# Patient Record
Sex: FEMALE | Race: BLACK OR AFRICAN AMERICAN | Employment: PART TIME | URBAN - METROPOLITAN AREA
[De-identification: names, ages, dates, MRNs, and addresses within clinical notes are randomized per-mention and may not be internally consistent; named-entity substitution may affect disease eponyms.]

---

## 2017-01-10 PROBLEM — R73.03 PREDIABETES: Status: ACTIVE | Noted: 2017-01-10

## 2017-02-13 PROBLEM — Z87.42 HX OF ABNORMAL CERVICAL PAPANICOLAOU SMEAR: Status: ACTIVE | Noted: 2017-02-13

## 2017-02-13 PROCEDURE — 88142 CYTOPATH C/V THIN LAYER: CPT | Performed by: NURSE PRACTITIONER

## 2017-02-15 ENCOUNTER — HOSPITAL ENCOUNTER (OUTPATIENT)
Dept: LAB | Age: 47
Discharge: HOME | End: 2017-02-15

## 2017-04-27 PROBLEM — N30.01 ACUTE CYSTITIS WITH HEMATURIA: Status: ACTIVE | Noted: 2017-04-27

## 2017-09-25 ENCOUNTER — HOSPITAL ENCOUNTER (EMERGENCY)
Age: 47
Discharge: HOME | End: 2017-09-25
Attending: EMERGENCY MEDICINE
Payer: COMMERCIAL

## 2017-09-25 ENCOUNTER — APPOINTMENT (OUTPATIENT)
Dept: CT IMAGING | Age: 47
End: 2017-09-25
Attending: EMERGENCY MEDICINE
Payer: COMMERCIAL

## 2017-09-25 VITALS
RESPIRATION RATE: 16 BRPM | OXYGEN SATURATION: 98 % | TEMPERATURE: 98 F | DIASTOLIC BLOOD PRESSURE: 80 MMHG | SYSTOLIC BLOOD PRESSURE: 135 MMHG | HEART RATE: 75 BPM

## 2017-09-25 DIAGNOSIS — R53.1 GENERALIZED WEAKNESS: ICD-10-CM

## 2017-09-25 DIAGNOSIS — N30.01 ACUTE CYSTITIS WITH HEMATURIA: Primary | ICD-10-CM

## 2017-09-25 LAB
ALBUMIN SERPL-MCNC: 3.6 G/DL (ref 3.5–5)
ALBUMIN/GLOB SERPL: 0.8 {RATIO} (ref 1.2–3.5)
ALP SERPL-CCNC: 72 U/L (ref 50–136)
ALT SERPL-CCNC: 21 U/L (ref 12–65)
ANION GAP SERPL CALC-SCNC: 7 MMOL/L (ref 7–16)
AST SERPL-CCNC: 22 U/L (ref 15–37)
BACTERIA URNS QL MICRO: ABNORMAL /HPF
BASOPHILS # BLD: 0 K/UL (ref 0–0.2)
BASOPHILS NFR BLD: 0 % (ref 0–2)
BILIRUB SERPL-MCNC: 0.2 MG/DL (ref 0.2–1.1)
BUN SERPL-MCNC: 12 MG/DL (ref 6–23)
CALCIUM SERPL-MCNC: 9.3 MG/DL (ref 8.3–10.4)
CASTS URNS QL MICRO: ABNORMAL /LPF
CHLORIDE SERPL-SCNC: 101 MMOL/L (ref 98–107)
CO2 SERPL-SCNC: 32 MMOL/L (ref 21–32)
CREAT SERPL-MCNC: 0.92 MG/DL (ref 0.6–1)
DIFFERENTIAL METHOD BLD: ABNORMAL
EOSINOPHIL # BLD: 0.1 K/UL (ref 0–0.8)
EOSINOPHIL NFR BLD: 2 % (ref 0.5–7.8)
EPI CELLS #/AREA URNS HPF: ABNORMAL /HPF
ERYTHROCYTE [DISTWIDTH] IN BLOOD BY AUTOMATED COUNT: 14.5 % (ref 11.9–14.6)
GLOBULIN SER CALC-MCNC: 4.5 G/DL (ref 2.3–3.5)
GLUCOSE SERPL-MCNC: 100 MG/DL (ref 65–100)
HCG UR QL: NEGATIVE
HCT VFR BLD AUTO: 33 % (ref 35.8–46.3)
HGB BLD-MCNC: 10.6 G/DL (ref 11.7–15.4)
IMM GRANULOCYTES # BLD: 0 K/UL (ref 0–0.5)
IMM GRANULOCYTES NFR BLD: 0.4 % (ref 0–5)
LYMPHOCYTES # BLD: 2 K/UL (ref 0.5–4.6)
LYMPHOCYTES NFR BLD: 39 % (ref 13–44)
MCH RBC QN AUTO: 26.1 PG (ref 26.1–32.9)
MCHC RBC AUTO-ENTMCNC: 32.1 G/DL (ref 31.4–35)
MCV RBC AUTO: 81.3 FL (ref 79.6–97.8)
MONOCYTES # BLD: 0.4 K/UL (ref 0.1–1.3)
MONOCYTES NFR BLD: 8 % (ref 4–12)
NEUTS SEG # BLD: 2.6 K/UL (ref 1.7–8.2)
NEUTS SEG NFR BLD: 51 % (ref 43–78)
PLATELET # BLD AUTO: 345 K/UL (ref 150–450)
PMV BLD AUTO: 10.4 FL (ref 10.8–14.1)
POTASSIUM SERPL-SCNC: 3.4 MMOL/L (ref 3.5–5.1)
PROT SERPL-MCNC: 8.1 G/DL (ref 6.3–8.2)
RBC # BLD AUTO: 4.06 M/UL (ref 4.05–5.25)
RBC #/AREA URNS HPF: >100 /HPF
SODIUM SERPL-SCNC: 140 MMOL/L (ref 136–145)
TROPONIN I BLD-MCNC: 0 NG/ML (ref 0.02–0.05)
WBC # BLD AUTO: 5.1 K/UL (ref 4.3–11.1)
WBC URNS QL MICRO: ABNORMAL /HPF

## 2017-09-25 PROCEDURE — 80053 COMPREHEN METABOLIC PANEL: CPT | Performed by: EMERGENCY MEDICINE

## 2017-09-25 PROCEDURE — 81025 URINE PREGNANCY TEST: CPT

## 2017-09-25 PROCEDURE — 81003 URINALYSIS AUTO W/O SCOPE: CPT | Performed by: EMERGENCY MEDICINE

## 2017-09-25 PROCEDURE — 93005 ELECTROCARDIOGRAM TRACING: CPT | Performed by: EMERGENCY MEDICINE

## 2017-09-25 PROCEDURE — 84484 ASSAY OF TROPONIN QUANT: CPT

## 2017-09-25 PROCEDURE — 70450 CT HEAD/BRAIN W/O DYE: CPT

## 2017-09-25 PROCEDURE — 81015 MICROSCOPIC EXAM OF URINE: CPT | Performed by: EMERGENCY MEDICINE

## 2017-09-25 PROCEDURE — 99284 EMERGENCY DEPT VISIT MOD MDM: CPT | Performed by: EMERGENCY MEDICINE

## 2017-09-25 PROCEDURE — 85025 COMPLETE CBC W/AUTO DIFF WBC: CPT | Performed by: EMERGENCY MEDICINE

## 2017-09-25 RX ORDER — CEPHALEXIN 500 MG/1
500 CAPSULE ORAL 2 TIMES DAILY
Qty: 14 CAP | Refills: 0 | Status: SHIPPED | OUTPATIENT
Start: 2017-09-25 | End: 2017-10-02

## 2017-09-26 LAB
ATRIAL RATE: 66 BPM
CALCULATED P AXIS, ECG09: 64 DEGREES
CALCULATED R AXIS, ECG10: 9 DEGREES
CALCULATED T AXIS, ECG11: 13 DEGREES
DIAGNOSIS, 93000: NORMAL
P-R INTERVAL, ECG05: 164 MS
Q-T INTERVAL, ECG07: 410 MS
QRS DURATION, ECG06: 72 MS
QTC CALCULATION (BEZET), ECG08: 429 MS
VENTRICULAR RATE, ECG03: 66 BPM

## 2017-09-26 NOTE — ED PROVIDER NOTES
HPI Comments: 60-year-old female presents with complaint of diffuse generalized weakness since Friday. Patient reports mild right-sided headache. Denies radiation of pain. Reports history of previous similar headaches. Patient reports intermittent left arm tingling. Patient with numerous other complaints. Denies history of CVA, slurred speech, facial droop, focal weakness, numbness, neck pain, back pain, chest pain, shortness of breath, abdominal pain, fever, chills or nausea, vomiting, LE edema/swelling. Patient is a 56 y.o. female presenting with arm pain. The history is provided by the patient. No  was used. Arm Pain    This is a new problem. The current episode started more than 2 days ago. The problem occurs constantly. The problem has not changed since onset. The pain is present in the left arm. The quality of the pain is described as aching. The pain is at a severity of 1/10. The pain is mild. Associated symptoms include tingling. Pertinent negatives include no numbness, full range of motion, no stiffness, no itching, no back pain and no neck pain. She has tried nothing for the symptoms. The treatment provided no relief. There has been no history of extremity trauma.         Past Medical History:   Diagnosis Date    Abnormal Papanicolaou smear of cervix     Anemia     Dyspepsia 12/22/2014    Encounter for long-term (current) use of other medications 9/24/2015    Fibroid, uterine     GERD (gastroesophageal reflux disease)     Heavy menstrual bleeding 1/26/2015    Hypertension     Kidney stone     Ovarian cyst     with pregnancy    Type II or unspecified type diabetes mellitus without mention of complication, not stated as uncontrolled 9/25/2015    Unspecified vitamin D deficiency 11/20/2014    Uterine fibroid 1/26/2015    UTI (urinary tract infection)     Vaginitis, atrophic        Past Surgical History:   Procedure Laterality Date    FRAGMENT KIDNEY STONE/ ESWL  HX TUBAL LIGATION      tubal    LAP,TUBAL CAUTERY           Family History:   Problem Relation Age of Onset    Diabetes Father     No Known Problems Sister     No Known Problems Brother     Seizures Mother     Hypertension Maternal Aunt     Hypertension Maternal Uncle     Diabetes Paternal Aunt     Diabetes Paternal Uncle        Social History     Social History    Marital status: LEGALLY      Spouse name: N/A    Number of children: N/A    Years of education: N/A     Occupational History    Not on file. Social History Main Topics    Smoking status: Never Smoker    Smokeless tobacco: Never Used    Alcohol use No      Comment: occasionally     Drug use: No    Sexual activity: Not Currently     Birth control/ protection: Surgical     Other Topics Concern    Not on file     Social History Narrative         ALLERGIES: Launie Guardian; Amlodipine; Lisinopril; and Sulfa (sulfonamide antibiotics)    Review of Systems   Constitutional: Negative for chills, fatigue and fever. HENT: Negative for congestion and rhinorrhea. Eyes: Negative for pain and redness. Respiratory: Negative for cough and shortness of breath. Cardiovascular: Negative for chest pain, palpitations and leg swelling. Gastrointestinal: Negative for abdominal distention, abdominal pain, anal bleeding, blood in stool, diarrhea, nausea and vomiting. Endocrine: Negative for polydipsia and polyphagia. Genitourinary: Negative for dysuria, flank pain, genital sores, hematuria, pelvic pain, vaginal bleeding and vaginal discharge. Musculoskeletal: Negative for back pain, gait problem, joint swelling, neck pain, neck stiffness and stiffness. Skin: Negative for itching, pallor and rash. Neurological: Positive for tingling. Negative for dizziness, seizures, syncope, weakness, numbness and headaches. Psychiatric/Behavioral: Negative for agitation and confusion.        Vitals:    09/25/17 2027 09/25/17 2126 09/25/17 2253   BP: 150/82 133/87 135/80   Pulse: 75 75 75   Resp: 16 16 16   Temp: 98 °F (36.7 °C)  98 °F (36.7 °C)   SpO2: 98% 99% 98%            Physical Exam   Constitutional: She is oriented to person, place, and time. She appears well-developed and well-nourished. No distress. HENT:   Head: Normocephalic and atraumatic. Mouth/Throat: Oropharynx is clear and moist. No oropharyngeal exudate. Eyes: Conjunctivae and EOM are normal. Pupils are equal, round, and reactive to light. Neck: Normal range of motion. No JVD present. No tracheal deviation present. Cardiovascular: Normal rate, regular rhythm and intact distal pulses. No murmur heard. Radial pulses 2+ and equal bilaterally    Pulmonary/Chest: Effort normal and breath sounds normal. No respiratory distress. She has no wheezes. She has no rales. She exhibits no tenderness. Abdominal: Soft. Bowel sounds are normal. She exhibits no distension. There is no tenderness. There is no rebound and no guarding. Musculoskeletal: Normal range of motion. She exhibits no edema, tenderness or deformity. Neurological: She is alert and oriented to person, place, and time. No cranial nerve deficit. Coordination normal.   Strength 5/5 throughout. Normal sensory exam.  No slurred speech. No facial droop. Ambulatory without assistance. Skin: Skin is warm and dry. No rash noted. No erythema. No pallor. Psychiatric: She has a normal mood and affect. Her behavior is normal.   Nursing note and vitals reviewed. MDM  Number of Diagnoses or Management Options  Acute cystitis with hematuria: new and requires workup  Generalized weakness: new and requires workup  Diagnosis management comments: Patient with numerous complaints including right-sided headache and left arm tingling sensation since Friday. .  Vital signs stable. Physical exam with no acute findings. Neuro within normal limits. Strength 5 out of 5 throughout. Normal sensory. CT head negative. Labs within normal limits. Patient found to have UTI. Will discharge home with Keflex & instructed to follow up care physician. Amount and/or Complexity of Data Reviewed  Clinical lab tests: ordered and reviewed  Tests in the radiology section of CPT®: ordered and reviewed  Tests in the medicine section of CPT®: ordered and reviewed  Review and summarize past medical records: yes  Independent visualization of images, tracings, or specimens: yes    Risk of Complications, Morbidity, and/or Mortality  Presenting problems: moderate  Diagnostic procedures: moderate  Management options: moderate    Patient Progress  Patient progress: stable    ED Course   Comment By Time   IMPRESSION:     NO ACUTE INTRACRANIAL ABNORMALITY IDENTIFIED AT NONCONTRAST CT.  Ajit Lowry MD 09/25 9292       EKG  Date/Time: 9/25/2017 10:37 PM  Performed by: Dario Hartman  Authorized by: Dario Hartman     ECG reviewed by ED Physician in the absence of a cardiologist: yes    Rate:     ECG rate:  66    ECG rate assessment: normal    Rhythm:     Rhythm: sinus rhythm    Ectopy:     Ectopy: none    QRS:     QRS axis:  Normal    QRS intervals:  Normal  Conduction:     Conduction: normal    ST segments:     ST segments:  Normal  T waves:     T waves: normal

## 2017-09-26 NOTE — ED NOTES
I have reviewed discharge instructions with the patient. The patient verbalized understanding. Patient left ED via Discharge Method: ambulatory to Home with son    Opportunity for questions and clarification provided. Patient given 1 scripts.

## 2017-09-26 NOTE — DISCHARGE INSTRUCTIONS
Weakness: Care Instructions  Your Care Instructions  Weakness is a lack of physical or muscle strength. You may feel that you need to make extra effort to move your arms, legs, or other muscles. Generalized weakness means that you feel weak in most areas of your body. Another type of weakness may affect just one muscle or group of muscles. You may feel weak and tired after you have done too much activity, such as taking an extra-long hike. This is not a serious problem. It often goes away on its own. Feeling weak can also be caused by medical conditions like thyroid problems, depression, or a virus. Sometimes the cause can be serious. Your doctor may want to do more tests to try to find the cause of the weakness. The doctor has checked you carefully, but problems can develop later. If you notice any problems or new symptoms, get medical treatment right away. Follow-up care is a key part of your treatment and safety. Be sure to make and go to all appointments, and call your doctor if you are having problems. It's also a good idea to know your test results and keep a list of the medicines you take. How can you care for yourself at home? · Rest when you feel tired. · Be safe with medicines. If your doctor prescribed medicine, take it exactly as prescribed. Call your doctor if you think you are having a problem with your medicine. You will get more details on the specific medicines your doctor prescribes. · Do not skip meals. Eating a balanced diet may increase your energy level. · Get some physical activity every day, but do not get too tired. When should you call for help? Call your doctor now or seek immediate medical care if:  · You have new or worse weakness. · You are dizzy or lightheaded, or you feel like you may faint. Watch closely for changes in your health, and be sure to contact your doctor if:  · You do not get better as expected. Where can you learn more?   Go to http://marek-zeke.info/. Enter [id number] [id number] [id number] in the search box to learn more about \"Weakness: Care Instructions. \"  Current as of: March 20, 2017  Content Version: 11.3  © 6556-5815 [employer]. Care instructions adapted under license by [employer] (which disclaims liability or warranty for this information). If you have questions about a medical condition or this instruction, always ask your healthcare professional. [address] any warranty or liability for your use of this information. Urinary Tract Infection in Women: Care Instructions  Your Care Instructions    A urinary tract infection, or UTI, is a general term for an infection anywhere between the kidneys and the urethra (where urine comes out). Most UTIs are bladder infections. They often cause pain or burning when you urinate. UTIs are caused by bacteria and can be cured with antibiotics. Be sure to complete your treatment so that the infection goes away. Follow-up care is a key part of your treatment and safety. Be sure to make and go to all appointments, and call your doctor if you are having problems. It's also a good idea to know your test results and keep a list of the medicines you take. How can you care for yourself at home? · Take your antibiotics as directed. Do not stop taking them just because you feel better. You need to take the full course of antibiotics. · Drink extra water and other fluids for the next day or two. This may help wash out the bacteria that are causing the infection. (If you have kidney, heart, or liver disease and have to limit fluids, talk with your doctor before you increase your fluid intake.)  · Avoid drinks that are carbonated or have caffeine. They can irritate the bladder. · Urinate often. Try to empty your bladder each time. · To relieve pain, take a hot bath or lay a heating pad set on low over your lower belly or genital area.  Never go to sleep with a heating pad in place. To prevent UTIs  · Drink plenty of water each day. This helps you urinate often, which clears bacteria from your system. (If you have kidney, heart, or liver disease and have to limit fluids, talk with your doctor before you increase your fluid intake.)  · Urinate when you need to. · Urinate right after you have sex. · Change sanitary pads often. · Avoid douches, bubble baths, feminine hygiene sprays, and other feminine hygiene products that have deodorants. · After going to the bathroom, wipe from front to back. When should you call for help? Call your doctor now or seek immediate medical care if:  · Symptoms such as fever, chills, nausea, or vomiting get worse or appear for the first time. · You have new pain in your back just below your rib cage. This is called flank pain. · There is new blood or pus in your urine. · You have any problems with your antibiotic medicine. Watch closely for changes in your health, and be sure to contact your doctor if:  · You are not getting better after taking an antibiotic for 2 days. · Your symptoms go away but then come back. Where can you learn more? Go to http://marek-zeke.info/. Enter U341 in the search box to learn more about \"Urinary Tract Infection in Women: Care Instructions. \"  Current as of: November 28, 2016  Content Version: 11.3  © 7657-6230 [employer]. Care instructions adapted under license by [employer] (which disclaims liability or warranty for this information). If you have questions about a medical condition or this instruction, always ask your healthcare professional. [address] any warranty or liability for your use of this information.

## 2017-09-26 NOTE — ED TRIAGE NOTES
Pt has multiple complaints. Left arm pain and tingling, right arm pain, behind right eye pressure, and left and right upper jaw pain. Pains started last week.

## 2017-10-12 ENCOUNTER — HOSPITAL ENCOUNTER (OUTPATIENT)
Dept: ULTRASOUND IMAGING | Age: 47
Discharge: HOME | End: 2017-10-12
Attending: FAMILY MEDICINE
Payer: COMMERCIAL

## 2017-10-12 DIAGNOSIS — M79.622 LEFT UPPER ARM PAIN: ICD-10-CM

## 2017-10-12 PROCEDURE — 93931 UPPER EXTREMITY STUDY: CPT

## 2018-05-01 PROBLEM — Z91.018 FOOD ALLERGY: Chronic | Status: ACTIVE | Noted: 2018-05-01

## 2018-05-01 PROBLEM — Z91.018 FOOD ALLERGY: Status: ACTIVE | Noted: 2018-05-01

## 2018-05-02 PROBLEM — J30.1 SEASONAL ALLERGIC RHINITIS DUE TO POLLEN: Status: ACTIVE | Noted: 2018-05-02

## 2018-05-02 PROBLEM — M62.838 MUSCLE SPASM: Status: ACTIVE | Noted: 2018-05-02

## 2018-08-28 PROBLEM — Z87.442 HISTORY OF KIDNEY STONES: Status: ACTIVE | Noted: 2018-08-28

## 2018-08-28 PROBLEM — N30.01 ACUTE CYSTITIS WITH HEMATURIA: Status: RESOLVED | Noted: 2017-04-27 | Resolved: 2018-08-28

## 2018-08-28 PROBLEM — M62.838 MUSCLE SPASM: Status: RESOLVED | Noted: 2018-05-02 | Resolved: 2018-08-28

## 2018-08-28 PROBLEM — Z87.442 HISTORY OF KIDNEY STONES: Chronic | Status: ACTIVE | Noted: 2018-08-28

## 2018-09-13 ENCOUNTER — HOSPITAL ENCOUNTER (OUTPATIENT)
Dept: CT IMAGING | Age: 48
Discharge: HOME | End: 2018-09-13
Attending: FAMILY MEDICINE
Payer: COMMERCIAL

## 2018-09-13 VITALS — WEIGHT: 165 LBS | BODY MASS INDEX: 31.15 KG/M2 | HEIGHT: 61 IN

## 2018-09-13 DIAGNOSIS — R10.2 PELVIC PAIN: ICD-10-CM

## 2018-09-13 DIAGNOSIS — R10.32 LLQ ABDOMINAL PAIN: ICD-10-CM

## 2018-09-13 DIAGNOSIS — R10.31 RLQ ABDOMINAL PAIN: ICD-10-CM

## 2018-09-13 PROCEDURE — 74011000258 HC RX REV CODE- 258: Performed by: FAMILY MEDICINE

## 2018-09-13 PROCEDURE — 74011636320 HC RX REV CODE- 636/320: Performed by: FAMILY MEDICINE

## 2018-09-13 PROCEDURE — 74177 CT ABD & PELVIS W/CONTRAST: CPT

## 2018-09-13 RX ORDER — SODIUM CHLORIDE 0.9 % (FLUSH) 0.9 %
10 SYRINGE (ML) INJECTION
Status: COMPLETED | OUTPATIENT
Start: 2018-09-13 | End: 2018-09-13

## 2018-09-13 RX ADMIN — SODIUM CHLORIDE 100 ML: 900 INJECTION, SOLUTION INTRAVENOUS at 09:18

## 2018-09-13 RX ADMIN — Medication 10 ML: at 09:18

## 2018-09-13 RX ADMIN — IOPAMIDOL 100 ML: 755 INJECTION, SOLUTION INTRAVENOUS at 09:18

## 2018-09-13 RX ADMIN — DIATRIZOATE MEGLUMINE AND DIATRIZOATE SODIUM 15 ML: 660; 100 LIQUID ORAL; RECTAL at 09:18

## 2018-09-13 NOTE — PROGRESS NOTES
CT abdomen and pelvis: kidney stones present on the left side.    She has a history of kidney stones and has a nephrologist.

## 2018-09-17 ENCOUNTER — HOSPITAL ENCOUNTER (OUTPATIENT)
Dept: LAB | Age: 48
Discharge: HOME | End: 2018-09-17
Payer: COMMERCIAL

## 2018-09-17 DIAGNOSIS — R00.2 PALPITATIONS: ICD-10-CM

## 2018-09-17 DIAGNOSIS — R00.1 BRADYCARDIA: ICD-10-CM

## 2018-09-17 PROBLEM — R10.9 FLANK PAIN: Status: ACTIVE | Noted: 2018-09-17

## 2018-09-17 LAB
T4 FREE SERPL-MCNC: 0.9 NG/DL (ref 0.78–1.46)
TSH SERPL DL<=0.005 MIU/L-ACNC: 1.34 UIU/ML (ref 0.36–3.74)

## 2018-09-17 PROCEDURE — 84443 ASSAY THYROID STIM HORMONE: CPT

## 2018-09-17 PROCEDURE — 84439 ASSAY OF FREE THYROXINE: CPT

## 2018-09-17 PROCEDURE — 36415 COLL VENOUS BLD VENIPUNCTURE: CPT

## 2018-09-17 NOTE — PROGRESS NOTES
Patient is aware of the results. She states that she does not currently have a Nephrologist. She is requesting to be referred to EUNICE Keating who she was previously seeing.

## 2018-09-18 NOTE — PROGRESS NOTES
Will place a referral to a nephrologist. Will let Cherie Rodríguez know she wants to see the nurse practitioner.

## 2019-03-03 ENCOUNTER — ANESTHESIA EVENT (OUTPATIENT)
Dept: SURGERY | Age: 49
End: 2019-03-03
Payer: COMMERCIAL

## 2019-03-04 ENCOUNTER — HOSPITAL ENCOUNTER (OUTPATIENT)
Age: 49
Setting detail: OUTPATIENT SURGERY
Discharge: HOME | End: 2019-03-04
Attending: UROLOGY | Admitting: UROLOGY
Payer: COMMERCIAL

## 2019-03-04 ENCOUNTER — ANESTHESIA (OUTPATIENT)
Dept: SURGERY | Age: 49
End: 2019-03-04
Payer: COMMERCIAL

## 2019-03-04 VITALS
SYSTOLIC BLOOD PRESSURE: 178 MMHG | HEART RATE: 70 BPM | OXYGEN SATURATION: 96 % | RESPIRATION RATE: 22 BRPM | DIASTOLIC BLOOD PRESSURE: 82 MMHG | TEMPERATURE: 97.5 F | WEIGHT: 162 LBS | BODY MASS INDEX: 30.61 KG/M2

## 2019-03-04 DIAGNOSIS — Z87.442 HISTORY OF KIDNEY STONES: Primary | Chronic | ICD-10-CM

## 2019-03-04 LAB
ANION GAP SERPL CALC-SCNC: 7 MMOL/L (ref 7–16)
BUN SERPL-MCNC: 8 MG/DL (ref 6–23)
CALCIUM SERPL-MCNC: 8.7 MG/DL (ref 8.3–10.4)
CHLORIDE SERPL-SCNC: 103 MMOL/L (ref 98–107)
CO2 SERPL-SCNC: 28 MMOL/L (ref 21–32)
CREAT SERPL-MCNC: 0.75 MG/DL (ref 0.6–1)
ERYTHROCYTE [DISTWIDTH] IN BLOOD BY AUTOMATED COUNT: 12.5 % (ref 11.9–14.6)
GLUCOSE SERPL-MCNC: 76 MG/DL (ref 65–100)
HCT VFR BLD AUTO: 35.3 % (ref 35.8–46.3)
HGB BLD-MCNC: 11.5 G/DL (ref 11.7–15.4)
MCH RBC QN AUTO: 28.6 PG (ref 26.1–32.9)
MCHC RBC AUTO-ENTMCNC: 32.6 G/DL (ref 31.4–35)
MCV RBC AUTO: 87.8 FL (ref 79.6–97.8)
NRBC # BLD: 0 K/UL (ref 0–0.2)
PLATELET # BLD AUTO: 280 K/UL (ref 150–450)
PMV BLD AUTO: 10.7 FL (ref 9.4–12.3)
POTASSIUM SERPL-SCNC: 4 MMOL/L (ref 3.5–5.1)
RBC # BLD AUTO: 4.02 M/UL (ref 4.05–5.2)
SODIUM SERPL-SCNC: 138 MMOL/L (ref 136–145)
WBC # BLD AUTO: 5 K/UL (ref 4.3–11.1)

## 2019-03-04 PROCEDURE — 74011250636 HC RX REV CODE- 250/636: Performed by: ANESTHESIOLOGY

## 2019-03-04 PROCEDURE — 77030010509 HC AIRWY LMA MSK TELE -A: Performed by: ANESTHESIOLOGY

## 2019-03-04 PROCEDURE — 76060000033 HC ANESTHESIA 1 TO 1.5 HR: Performed by: UROLOGY

## 2019-03-04 PROCEDURE — 74011250636 HC RX REV CODE- 250/636

## 2019-03-04 PROCEDURE — 80048 BASIC METABOLIC PNL TOTAL CA: CPT

## 2019-03-04 PROCEDURE — 77030032490 HC SLV COMPR SCD KNE COVD -B: Performed by: UROLOGY

## 2019-03-04 PROCEDURE — 74011250637 HC RX REV CODE- 250/637: Performed by: ANESTHESIOLOGY

## 2019-03-04 PROCEDURE — 74011000250 HC RX REV CODE- 250

## 2019-03-04 PROCEDURE — 50590 FRAGMENTING OF KIDNEY STONE: CPT | Performed by: UROLOGY

## 2019-03-04 PROCEDURE — 76210000006 HC OR PH I REC 0.5 TO 1 HR: Performed by: UROLOGY

## 2019-03-04 PROCEDURE — 76010000138 HC OR TIME 0.5 TO 1 HR: Performed by: UROLOGY

## 2019-03-04 PROCEDURE — 77030020782 HC GWN BAIR PAWS FLX 3M -B: Performed by: ANESTHESIOLOGY

## 2019-03-04 PROCEDURE — 85027 COMPLETE CBC AUTOMATED: CPT

## 2019-03-04 PROCEDURE — 76210000026 HC REC RM PH II 1 TO 1.5 HR: Performed by: UROLOGY

## 2019-03-04 RX ORDER — DEXAMETHASONE SODIUM PHOSPHATE 4 MG/ML
INJECTION, SOLUTION INTRA-ARTICULAR; INTRALESIONAL; INTRAMUSCULAR; INTRAVENOUS; SOFT TISSUE AS NEEDED
Status: DISCONTINUED | OUTPATIENT
Start: 2019-03-04 | End: 2019-03-04 | Stop reason: HOSPADM

## 2019-03-04 RX ORDER — TAMSULOSIN HYDROCHLORIDE 0.4 MG/1
0.4 CAPSULE ORAL DAILY
Qty: 20 CAP | Refills: 0 | Status: SHIPPED | OUTPATIENT
Start: 2019-03-04 | End: 2019-08-26

## 2019-03-04 RX ORDER — CEFAZOLIN SODIUM/WATER 2 G/20 ML
2 SYRINGE (ML) INTRAVENOUS
Status: DISCONTINUED | OUTPATIENT
Start: 2019-03-04 | End: 2019-03-04 | Stop reason: HOSPADM

## 2019-03-04 RX ORDER — NALOXONE HYDROCHLORIDE 0.4 MG/ML
0.04 INJECTION, SOLUTION INTRAMUSCULAR; INTRAVENOUS; SUBCUTANEOUS
Status: DISCONTINUED | OUTPATIENT
Start: 2019-03-04 | End: 2019-03-04 | Stop reason: HOSPADM

## 2019-03-04 RX ORDER — ONDANSETRON 2 MG/ML
INJECTION INTRAMUSCULAR; INTRAVENOUS AS NEEDED
Status: DISCONTINUED | OUTPATIENT
Start: 2019-03-04 | End: 2019-03-04 | Stop reason: HOSPADM

## 2019-03-04 RX ORDER — HYDROMORPHONE HYDROCHLORIDE 2 MG/ML
0.5 INJECTION, SOLUTION INTRAMUSCULAR; INTRAVENOUS; SUBCUTANEOUS
Status: DISCONTINUED | OUTPATIENT
Start: 2019-03-04 | End: 2019-03-04 | Stop reason: HOSPADM

## 2019-03-04 RX ORDER — LIDOCAINE HYDROCHLORIDE 10 MG/ML
0.1 INJECTION INFILTRATION; PERINEURAL AS NEEDED
Status: DISCONTINUED | OUTPATIENT
Start: 2019-03-04 | End: 2019-03-04 | Stop reason: HOSPADM

## 2019-03-04 RX ORDER — OXYCODONE HYDROCHLORIDE 5 MG/1
5 TABLET ORAL
Status: COMPLETED | OUTPATIENT
Start: 2019-03-04 | End: 2019-03-04

## 2019-03-04 RX ORDER — FENTANYL CITRATE 50 UG/ML
100 INJECTION, SOLUTION INTRAMUSCULAR; INTRAVENOUS ONCE
Status: DISCONTINUED | OUTPATIENT
Start: 2019-03-04 | End: 2019-03-04 | Stop reason: HOSPADM

## 2019-03-04 RX ORDER — SODIUM CHLORIDE, SODIUM LACTATE, POTASSIUM CHLORIDE, CALCIUM CHLORIDE 600; 310; 30; 20 MG/100ML; MG/100ML; MG/100ML; MG/100ML
100 INJECTION, SOLUTION INTRAVENOUS CONTINUOUS
Status: DISCONTINUED | OUTPATIENT
Start: 2019-03-04 | End: 2019-03-04 | Stop reason: HOSPADM

## 2019-03-04 RX ORDER — MIDAZOLAM HYDROCHLORIDE 1 MG/ML
2 INJECTION, SOLUTION INTRAMUSCULAR; INTRAVENOUS
Status: COMPLETED | OUTPATIENT
Start: 2019-03-04 | End: 2019-03-04

## 2019-03-04 RX ORDER — OXYCODONE HYDROCHLORIDE 5 MG/1
5 TABLET ORAL ONCE
Status: COMPLETED | OUTPATIENT
Start: 2019-03-04 | End: 2019-03-04

## 2019-03-04 RX ORDER — SODIUM CHLORIDE, SODIUM LACTATE, POTASSIUM CHLORIDE, CALCIUM CHLORIDE 600; 310; 30; 20 MG/100ML; MG/100ML; MG/100ML; MG/100ML
INJECTION, SOLUTION INTRAVENOUS
Status: DISCONTINUED | OUTPATIENT
Start: 2019-03-04 | End: 2019-03-04 | Stop reason: HOSPADM

## 2019-03-04 RX ORDER — LIDOCAINE HYDROCHLORIDE 20 MG/ML
INJECTION, SOLUTION EPIDURAL; INFILTRATION; INTRACAUDAL; PERINEURAL AS NEEDED
Status: DISCONTINUED | OUTPATIENT
Start: 2019-03-04 | End: 2019-03-04 | Stop reason: HOSPADM

## 2019-03-04 RX ORDER — PROPOFOL 10 MG/ML
INJECTION, EMULSION INTRAVENOUS AS NEEDED
Status: DISCONTINUED | OUTPATIENT
Start: 2019-03-04 | End: 2019-03-04 | Stop reason: HOSPADM

## 2019-03-04 RX ORDER — HYDROCODONE BITARTRATE AND ACETAMINOPHEN 5; 325 MG/1; MG/1
1-2 TABLET ORAL
Qty: 20 TAB | Refills: 0 | Status: SHIPPED | OUTPATIENT
Start: 2019-03-04 | End: 2019-03-09

## 2019-03-04 RX ORDER — FENTANYL CITRATE 50 UG/ML
INJECTION, SOLUTION INTRAMUSCULAR; INTRAVENOUS AS NEEDED
Status: DISCONTINUED | OUTPATIENT
Start: 2019-03-04 | End: 2019-03-04 | Stop reason: HOSPADM

## 2019-03-04 RX ORDER — MIDAZOLAM HYDROCHLORIDE 1 MG/ML
2 INJECTION, SOLUTION INTRAMUSCULAR; INTRAVENOUS ONCE
Status: DISCONTINUED | OUTPATIENT
Start: 2019-03-04 | End: 2019-03-04 | Stop reason: HOSPADM

## 2019-03-04 RX ADMIN — ONDANSETRON 4 MG: 2 INJECTION INTRAMUSCULAR; INTRAVENOUS at 16:06

## 2019-03-04 RX ADMIN — OXYCODONE HYDROCHLORIDE 5 MG: 5 TABLET ORAL at 16:57

## 2019-03-04 RX ADMIN — FENTANYL CITRATE 50 MCG: 50 INJECTION, SOLUTION INTRAMUSCULAR; INTRAVENOUS at 15:22

## 2019-03-04 RX ADMIN — MIDAZOLAM HYDROCHLORIDE 2 MG: 2 INJECTION, SOLUTION INTRAMUSCULAR; INTRAVENOUS at 15:13

## 2019-03-04 RX ADMIN — SODIUM CHLORIDE, SODIUM LACTATE, POTASSIUM CHLORIDE, CALCIUM CHLORIDE: 600; 310; 30; 20 INJECTION, SOLUTION INTRAVENOUS at 15:15

## 2019-03-04 RX ADMIN — DEXAMETHASONE SODIUM PHOSPHATE 4 MG: 4 INJECTION, SOLUTION INTRA-ARTICULAR; INTRALESIONAL; INTRAMUSCULAR; INTRAVENOUS; SOFT TISSUE at 15:30

## 2019-03-04 RX ADMIN — SODIUM CHLORIDE, SODIUM LACTATE, POTASSIUM CHLORIDE, AND CALCIUM CHLORIDE 100 ML/HR: 600; 310; 30; 20 INJECTION, SOLUTION INTRAVENOUS at 14:09

## 2019-03-04 RX ADMIN — FENTANYL CITRATE 50 MCG: 50 INJECTION, SOLUTION INTRAMUSCULAR; INTRAVENOUS at 15:34

## 2019-03-04 RX ADMIN — OXYCODONE HYDROCHLORIDE 5 MG: 5 TABLET ORAL at 17:40

## 2019-03-04 RX ADMIN — LIDOCAINE HYDROCHLORIDE 50 MG: 20 INJECTION, SOLUTION EPIDURAL; INFILTRATION; INTRACAUDAL; PERINEURAL at 15:22

## 2019-03-04 RX ADMIN — PROPOFOL 200 MG: 10 INJECTION, EMULSION INTRAVENOUS at 15:22

## 2019-03-04 NOTE — BRIEF OP NOTE
BRIEF OPERATIVE NOTE Date of Procedure: 3/4/2019 Preoperative Diagnosis: Stone, kidney [N20.0] Postoperative Diagnosis: Stone, kidney [N20.0] Procedure(s): LEFT LITHOTRIPSY EXTRACORPOREAL SHOCKWAVE ESWL Surgeon(s) and Role: * Pippa Vera MD - Primary Surgical Assistant: none Surgical Staff: 
Circ-1: Giovani Jeffers RN Radiology Technician: Laura Bhat RT, R, CT Event Time In Time Out Incision Start [phone] Incision Close 1610 Anesthesia: General  
Estimated Blood Loss: none Specimens: * No specimens in log * Findings: see op note Complications: none Implants: * No implants in log *

## 2019-03-04 NOTE — ANESTHESIA PREPROCEDURE EVALUATION
Anesthetic History No history of anesthetic complications Review of Systems / Medical History Patient summary reviewed and pertinent labs reviewed Pulmonary Within defined limits Neuro/Psych Within defined limits Cardiovascular Hypertension: well controlled Exercise tolerance: >4 METS 
  
GI/Hepatic/Renal 
Within defined limits Endo/Other Obesity Other Findings Physical Exam 
 
Airway Mallampati: II 
TM Distance: 4 - 6 cm Neck ROM: normal range of motion Mouth opening: Normal 
 
 Cardiovascular Rhythm: regular Rate: normal 
 
 
 
 Dental 
 
 
  
Pulmonary Breath sounds clear to auscultation Abdominal 
 
 
 
 Other Findings Anesthetic Plan ASA: 2 Anesthesia type: general 
 
 
 
 
Induction: Intravenous Anesthetic plan and risks discussed with: Patient and Son / Daughter

## 2019-03-04 NOTE — PERIOP NOTES
Preoperative flank skin condition: warm, dry, intact Lead shield: yes Patient ear protection: yes Gel applied to patient's flank and Lithotripsy head: yes Starting power level: 3 Shock start time (first  fluoroscopy): 5637 Shock stop time (last lithotripsy shock): 1610 Ending power level: 11 Total shocks: 3000 Total fluoroscopy time: 2 min 11 sec Postoperative flank skin condition: pink

## 2019-03-04 NOTE — ANESTHESIA POSTPROCEDURE EVALUATION
Procedure(s): LEFT LITHOTRIPSY EXTRACORPOREAL SHOCKWAVE ESWL. Anesthesia Post Evaluation Patient location during evaluation: PACU Patient participation: complete - patient participated Level of consciousness: awake and alert Pain management: adequate Airway patency: patent Anesthetic complications: no 
Cardiovascular status: acceptable Respiratory status: acceptable Hydration status: acceptable Post anesthesia nausea and vomiting:  none Visit Vitals /84 Pulse 60 Temp 36.4 °C (97.5 °F) Resp 17 Wt 73.5 kg (162 lb) SpO2 100% BMI 30.61 kg/m²

## 2019-03-05 NOTE — OP NOTES
[facility]  OPERATIVE REPORT    Name:  Rodney Ruvalcaba  MR#:  [id number]  :  1970  ACCOUNT #:  [de-identified]  DATE OF SERVICE:  2019      PREOPERATIVE DIAGNOSIS:  Left renal calculus. POSTOPERATIVE DIAGNOSIS:  Left renal calculus. PROCEDURE:  Left extracorporeal shock wave lithotripsy. SURGEON:  Moni Powell MD    ASSISTANT:  None. ANESTHESIA:  General.    COMPLICATIONS:  None. SPECIMENS:  None. IMPLANTS:  None. ESTIMATED BLOOD LOSS:  None. FINDINGS:  An 8 mm stone in the area of the left renal pelvis. DESCRIPTION OF THE PROCEDURE:  The patient was given general anesthetic, placed in the supine position on the lithotripsy treatment table. Fluoroscopy was used to identify the stone. There was an 8 mm stone in the area of the left renal pelvis. Treatment was began at a low treatment setting, gradually increased up to a power setting of 11 kV. A total of 3000 shocks were given. The stone showed excellent pulverization. She tolerated this well. PLAN:  She will be discharged home. She is to return to the office in two weeks for KUB.       MD ZELDA Burt/[email or web address]/  D:  2019 16:31  T:  2019 3:26  JOB #:  [id number]

## 2019-08-26 ENCOUNTER — HOSPITAL ENCOUNTER (EMERGENCY)
Age: 49
Discharge: HOME | End: 2019-08-26
Attending: EMERGENCY MEDICINE
Payer: SELF-PAY

## 2019-08-26 VITALS
SYSTOLIC BLOOD PRESSURE: 151 MMHG | HEIGHT: 61 IN | RESPIRATION RATE: 18 BRPM | TEMPERATURE: 98.2 F | BODY MASS INDEX: 30.58 KG/M2 | HEART RATE: 61 BPM | OXYGEN SATURATION: 94 % | DIASTOLIC BLOOD PRESSURE: 86 MMHG | WEIGHT: 162 LBS

## 2019-08-26 DIAGNOSIS — R10.32 ABDOMINAL PAIN, LLQ (LEFT LOWER QUADRANT): ICD-10-CM

## 2019-08-26 DIAGNOSIS — N30.01 ACUTE CYSTITIS WITH HEMATURIA: Primary | ICD-10-CM

## 2019-08-26 LAB
ALBUMIN SERPL-MCNC: 3.6 G/DL (ref 3.5–5)
ALBUMIN/GLOB SERPL: 0.9 {RATIO} (ref 1.2–3.5)
ALP SERPL-CCNC: 64 U/L (ref 50–130)
ALT SERPL-CCNC: 19 U/L (ref 12–65)
ANION GAP SERPL CALC-SCNC: 7 MMOL/L (ref 7–16)
AST SERPL-CCNC: 18 U/L (ref 15–37)
BACTERIA URNS QL MICRO: ABNORMAL /HPF
BASOPHILS # BLD: 0 K/UL (ref 0–0.2)
BASOPHILS NFR BLD: 0 % (ref 0–2)
BILIRUB SERPL-MCNC: 0.2 MG/DL (ref 0.2–1.1)
BUN SERPL-MCNC: 10 MG/DL (ref 6–23)
CALCIUM SERPL-MCNC: 9.3 MG/DL (ref 8.3–10.4)
CASTS URNS QL MICRO: ABNORMAL /LPF
CHLORIDE SERPL-SCNC: 104 MMOL/L (ref 98–107)
CO2 SERPL-SCNC: 29 MMOL/L (ref 21–32)
CREAT SERPL-MCNC: 0.84 MG/DL (ref 0.6–1)
DIFFERENTIAL METHOD BLD: NORMAL
EOSINOPHIL # BLD: 0.1 K/UL (ref 0–0.8)
EOSINOPHIL NFR BLD: 1 % (ref 0.5–7.8)
EPI CELLS #/AREA URNS HPF: ABNORMAL /HPF
ERYTHROCYTE [DISTWIDTH] IN BLOOD BY AUTOMATED COUNT: 13.2 % (ref 11.9–14.6)
GLOBULIN SER CALC-MCNC: 4.2 G/DL (ref 2.3–3.5)
GLUCOSE SERPL-MCNC: 117 MG/DL (ref 65–100)
HCG UR QL: NEGATIVE
HCT VFR BLD AUTO: 37.7 % (ref 35.8–46.3)
HGB BLD-MCNC: 12.3 G/DL (ref 11.7–15.4)
IMM GRANULOCYTES # BLD AUTO: 0 K/UL (ref 0–0.5)
IMM GRANULOCYTES NFR BLD AUTO: 0 % (ref 0–5)
LYMPHOCYTES # BLD: 1.8 K/UL (ref 0.5–4.6)
LYMPHOCYTES NFR BLD: 31 % (ref 13–44)
MCH RBC QN AUTO: 28.9 PG (ref 26.1–32.9)
MCHC RBC AUTO-ENTMCNC: 32.6 G/DL (ref 31.4–35)
MCV RBC AUTO: 88.5 FL (ref 79.6–97.8)
MONOCYTES # BLD: 0.5 K/UL (ref 0.1–1.3)
MONOCYTES NFR BLD: 8 % (ref 4–12)
NEUTS SEG # BLD: 3.4 K/UL (ref 1.7–8.2)
NEUTS SEG NFR BLD: 59 % (ref 43–78)
NRBC # BLD: 0 K/UL (ref 0–0.2)
PLATELET # BLD AUTO: 254 K/UL (ref 150–450)
PMV BLD AUTO: 10 FL (ref 9.4–12.3)
POTASSIUM SERPL-SCNC: 4.1 MMOL/L (ref 3.5–5.1)
PROT SERPL-MCNC: 7.8 G/DL (ref 6.3–8.2)
RBC # BLD AUTO: 4.26 M/UL (ref 4.05–5.2)
RBC #/AREA URNS HPF: ABNORMAL /HPF
SODIUM SERPL-SCNC: 140 MMOL/L (ref 136–145)
WBC # BLD AUTO: 5.8 K/UL (ref 4.3–11.1)
WBC URNS QL MICRO: ABNORMAL /HPF

## 2019-08-26 PROCEDURE — 81025 URINE PREGNANCY TEST: CPT

## 2019-08-26 PROCEDURE — 81015 MICROSCOPIC EXAM OF URINE: CPT

## 2019-08-26 PROCEDURE — 81003 URINALYSIS AUTO W/O SCOPE: CPT | Performed by: EMERGENCY MEDICINE

## 2019-08-26 PROCEDURE — 99284 EMERGENCY DEPT VISIT MOD MDM: CPT | Performed by: EMERGENCY MEDICINE

## 2019-08-26 PROCEDURE — 80053 COMPREHEN METABOLIC PANEL: CPT

## 2019-08-26 PROCEDURE — 85025 COMPLETE CBC W/AUTO DIFF WBC: CPT

## 2019-08-26 RX ORDER — CEPHALEXIN 500 MG/1
500 CAPSULE ORAL 3 TIMES DAILY
Qty: 21 CAP | Refills: 0 | Status: SHIPPED | OUTPATIENT
Start: 2019-08-26 | End: 2019-09-02

## 2019-08-26 NOTE — DISCHARGE INSTRUCTIONS
Follow-up with Dr. Kane Neves, call her office to make an appointment. Return to the emergency department if your symptoms worsen despite the antibiotics.

## 2019-08-26 NOTE — ED PROVIDER NOTES
Patient states she has been having abdominal pain for the past 2 months. She also has not had a period over that time. She describes the pain as left-sided achy sharp pain without any aggravating or alleviating factors. She has had urinary frequency but denies any dysuria. She denies any fever or vomiting, states she has had frequent loose stools at times. She has not taken any medicine for symptoms, denies similar symptoms in the past.    Elements of this note were made using speech recognition software. As such, errors of speech recognition may occur.            Past Medical History:   Diagnosis Date    Abnormal Papanicolaou smear of cervix     Anemia     Dyspepsia 12/22/2014    Encounter for long-term (current) use of other medications 9/24/2015    Fibroid, uterine     GERD (gastroesophageal reflux disease)     no meds    Heavy menstrual bleeding 1/26/2015    History of kidney stones 8/28/2018    Hypertension     no meds    Kidney stone     Ovarian cyst     with pregnancy    Type II or unspecified type diabetes mellitus without mention of complication, not stated as uncontrolled 09/25/2015    borderline- no meds    Unspecified vitamin D deficiency 11/20/2014    Uterine fibroid 1/26/2015    UTI (urinary tract infection)     Vaginitis, atrophic        Past Surgical History:   Procedure Laterality Date    FRAGMENT KIDNEY STONE/ ESWL      x 2    HX TUBAL LIGATION      tubal    LAP,TUBAL CAUTERY           Family History:   Problem Relation Age of Onset    Diabetes Father     No Known Problems Sister     No Known Problems Brother     Seizures Mother     Hypertension Maternal Aunt     Hypertension Maternal Uncle     Diabetes Paternal Aunt     Diabetes Paternal Uncle     Coronary Artery Disease Neg Hx        Social History     Socioeconomic History    Marital status:      Spouse name: Not on file    Number of children: Not on file    Years of education: Not on file   [place] Highest education level: Not on file   Occupational History    Not on file   Social Needs    Financial resource strain: Not on file    Food insecurity:     Worry: Not on file     Inability: Not on file    Transportation needs:     Medical: Not on file     Non-medical: Not on file   Tobacco Use    Smoking status: Never Smoker    Smokeless tobacco: Never Used   Substance and Sexual Activity    Alcohol use: No    Drug use: No    Sexual activity: Not Currently     Birth control/protection: Surgical   Lifestyle    Physical activity:     Days per week: Not on file     Minutes per session: Not on file    Stress: Not on file   Relationships    Social connections:     Talks on phone: Not on file     Gets together: Not on file     Attends [employer] service: Not on file     Active member of club or organization: Not on file     Attends meetings of clubs or organizations: Not on file     Relationship status: Not on file    Intimate partner violence:     Fear of current or ex partner: Not on file     Emotionally abused: Not on file     Physically abused: Not on file     Forced sexual activity: Not on file   Other Topics Concern    Not on file   Social History Narrative    Not on file         ALLERGIES: Benicar Lars Parlor; Amlodipine; Lisinopril; and Sulfa (sulfonamide antibiotics)    Review of Systems   Constitutional: Negative for chills and fever. Gastrointestinal: Positive for abdominal pain. Negative for nausea and vomiting. All other systems reviewed and are negative. Vitals:    08/26/19 1612   BP: (!) 162/95   Pulse: 78   Resp: 18   Temp: 98.4 °F (36.9 °C)   SpO2: 99%   Weight: 73.5 kg (162 lb)   Height: 5' 1\" (1.549 m)            Physical Exam   Constitutional: She is oriented to person, place, and time. She appears well-developed and well-nourished. HENT:   Head: Normocephalic and atraumatic. Eyes: Pupils are equal, round, and reactive to light.  Conjunctivae are normal.   Neck: Normal range of motion. Neck supple. Cardiovascular: Normal rate, regular rhythm and normal heart sounds. Pulmonary/Chest: Effort normal and breath sounds normal.   Abdominal: Soft. Bowel sounds are normal. She exhibits no distension. There is no tenderness. There is no guarding. Musculoskeletal: She exhibits no edema or tenderness. Neurological: She is alert and oriented to person, place, and time. Skin: Skin is warm and dry. Psychiatric: She has a normal mood and affect. Her behavior is normal.   Nursing note and vitals reviewed. MDM  Number of Diagnoses or Management Options  Abdominal pain, LLQ (left lower quadrant): new and requires workup  Acute cystitis with hematuria: new and does not require workup  Diagnosis management comments: 8:16 PM discussed results with patient, need for antibiotics. She appears comfortable and in no distress. She has a primary doctor she can follow-up with.        Amount and/or Complexity of Data Reviewed  Clinical lab tests: ordered and reviewed    Risk of Complications, Morbidity, and/or Mortality  Presenting problems: moderate  Diagnostic procedures: moderate  Management options: moderate    Patient Progress  Patient progress: improved         Procedures

## 2019-08-26 NOTE — ED TRIAGE NOTES
Pt in states nausea and left side abdominal pain x 1 month. States she has not had menstrual period for 2 months.  States has had tubal. States vomiting or diarrhea

## 2019-08-27 NOTE — ED NOTES
I have reviewed discharge instructions with the patient. The patient verbalized understanding. Patient left ED via Discharge Method: ambulatory to Home with self. Opportunity for questions and clarification provided. Patient given 1 scripts. To continue your aftercare when you leave the hospital, you may receive an automated call from our care team to check in on how you are doing. This is a free service and part of our promise to provide the best care and service to meet your aftercare needs.  If you have questions, or wish to unsubscribe from this service please call [phone]. Thank you for Choosing our [address] [place] [address] Emergency Department.

## 2020-08-01 PROBLEM — R10.2 PELVIC PAIN: Status: ACTIVE | Noted: 2020-08-01

## 2020-08-01 PROBLEM — N91.2 AMENORRHEA: Status: ACTIVE | Noted: 2020-08-01

## 2020-08-03 ENCOUNTER — HOSPITAL ENCOUNTER (OUTPATIENT)
Dept: ULTRASOUND IMAGING | Age: 50
Discharge: HOME | End: 2020-08-03
Attending: FAMILY MEDICINE
Payer: COMMERCIAL

## 2020-08-03 DIAGNOSIS — N91.2 AMENORRHEA: ICD-10-CM

## 2020-08-03 DIAGNOSIS — R10.2 PELVIC PAIN: ICD-10-CM

## 2020-08-03 PROCEDURE — 76830 TRANSVAGINAL US NON-OB: CPT

## 2020-09-05 NOTE — PROGRESS NOTES
Pelvic ultrasound: there is a small 1.8 cm uterine fibroid. Also a 1.4 cm left ovarian follicle. This may be causing the pelvic pain.  Will refer to a gynecologist.

## 2020-11-11 ENCOUNTER — HOSPITAL ENCOUNTER (EMERGENCY)
Age: 50
Discharge: HOME | End: 2020-11-11
Attending: EMERGENCY MEDICINE
Payer: COMMERCIAL

## 2020-11-11 VITALS
WEIGHT: 160 LBS | TEMPERATURE: 98.5 F | OXYGEN SATURATION: 96 % | HEIGHT: 61 IN | SYSTOLIC BLOOD PRESSURE: 160 MMHG | BODY MASS INDEX: 30.21 KG/M2 | RESPIRATION RATE: 18 BRPM | DIASTOLIC BLOOD PRESSURE: 91 MMHG | HEART RATE: 84 BPM

## 2020-11-11 DIAGNOSIS — N30.01 ACUTE CYSTITIS WITH HEMATURIA: Primary | ICD-10-CM

## 2020-11-11 LAB
BACTERIA URNS QL MICRO: 0 /HPF
CASTS URNS QL MICRO: 0 /LPF
EPI CELLS #/AREA URNS HPF: ABNORMAL /HPF
HCG UR QL: NEGATIVE
RBC #/AREA URNS HPF: ABNORMAL /HPF
WBC URNS QL MICRO: >100 /HPF

## 2020-11-11 PROCEDURE — 74011250637 HC RX REV CODE- 250/637: Performed by: EMERGENCY MEDICINE

## 2020-11-11 PROCEDURE — 81015 MICROSCOPIC EXAM OF URINE: CPT

## 2020-11-11 PROCEDURE — 81003 URINALYSIS AUTO W/O SCOPE: CPT

## 2020-11-11 PROCEDURE — 81025 URINE PREGNANCY TEST: CPT

## 2020-11-11 PROCEDURE — 99284 EMERGENCY DEPT VISIT MOD MDM: CPT

## 2020-11-11 PROCEDURE — 87086 URINE CULTURE/COLONY COUNT: CPT

## 2020-11-11 RX ORDER — CEPHALEXIN 500 MG/1
500 CAPSULE ORAL
Status: COMPLETED | OUTPATIENT
Start: 2020-11-11 | End: 2020-11-11

## 2020-11-11 RX ORDER — IBUPROFEN 400 MG/1
TABLET ORAL
Status: ACTIVE
Start: 2020-11-11 | End: 2020-11-11

## 2020-11-11 RX ORDER — CEPHALEXIN 500 MG/1
500 CAPSULE ORAL 2 TIMES DAILY
Qty: 10 CAP | Refills: 0 | Status: SHIPPED | OUTPATIENT
Start: 2020-11-11 | End: 2020-11-18

## 2020-11-11 RX ORDER — CEPHALEXIN 500 MG/1
CAPSULE ORAL
Status: ACTIVE
Start: 2020-11-11 | End: 2020-11-11

## 2020-11-11 RX ORDER — IBUPROFEN 400 MG/1
400 TABLET ORAL
Status: COMPLETED | OUTPATIENT
Start: 2020-11-11 | End: 2020-11-11

## 2020-11-11 RX ADMIN — IBUPROFEN 400 MG: 400 TABLET, FILM COATED ORAL at 03:09

## 2020-11-11 RX ADMIN — CEPHALEXIN 500 MG: 500 CAPSULE ORAL at 03:17

## 2020-11-11 NOTE — ED NOTES
I have reviewed discharge instructions with the patient. The patient verbalized understanding. Patient left ED via Discharge Method: ambulatory to Home with self. Opportunity for questions and clarification provided. Patient given 1 scripts. To continue your aftercare when you leave the hospital, you may receive an automated call from our care team to check in on how you are doing. This is a free service and part of our promise to provide the best care and service to meet your aftercare needs.  If you have questions, or wish to unsubscribe from this service please call [phone]. Thank you for Choosing our [facility] Emergency Department.

## 2020-11-11 NOTE — ED PROVIDER NOTES
81-year-old lady presents with concerns about painful urination. She says with this she has had no other symptoms including no vaginal discharge, itch, burning, or odor. Patient says she has had multiple previous UTIs and this feels very similar. She has tried some over-the-counter medications without significant relief. Elements of this note were created using speech recognition software. As such, errors of speech recognition may be present.            Past Medical History:   Diagnosis Date    Abnormal Papanicolaou smear of cervix     Anemia     Dyspepsia 12/22/2014    Encounter for long-term (current) use of other medications 9/24/2015    Fibroid, uterine     GERD (gastroesophageal reflux disease)     no meds    Heavy menstrual bleeding 1/26/2015    History of kidney stones 8/28/2018    Hypertension     no meds    Kidney stone     Ovarian cyst     with pregnancy    Type II or unspecified type diabetes mellitus without mention of complication, not stated as uncontrolled 09/25/2015    borderline- no meds    Unspecified vitamin D deficiency 11/20/2014    Uterine fibroid 1/26/2015    UTI (urinary tract infection)     Vaginitis, atrophic        Past Surgical History:   Procedure Laterality Date    FRAGMENT KIDNEY STONE/ ESWL      x 2    HX TUBAL LIGATION      tubal    LAP,TUBAL CAUTERY           Family History:   Problem Relation Age of Onset    Diabetes Father     No Known Problems Sister     No Known Problems Brother     Seizures Mother     Hypertension Maternal Aunt     Hypertension Maternal Uncle     Diabetes Paternal Aunt     Diabetes Paternal Uncle     Coronary Artery Disease Neg Hx        Social History     Socioeconomic History    Marital status:      Spouse name: Not on file    Number of children: Not on file    Years of education: Not on file    Highest education level: Not on file   Occupational History    Not on file   Social Needs    Financial resource strain: Not on file    Food insecurity     Worry: Not on file     Inability: Not on file    Transportation needs     Medical: Not on file     Non-medical: Not on file   Tobacco Use    Smoking status: Never Smoker    Smokeless tobacco: Never Used   Substance and Sexual Activity    Alcohol use: No    Drug use: No    Sexual activity: Not Currently     Birth control/protection: Surgical   Lifestyle    Physical activity     Days per week: Not on file     Minutes per session: Not on file    Stress: Not on file   Relationships    Social connections     Talks on phone: Not on file     Gets together: Not on file     Attends Orthodoxy service: Not on file     Active member of club or organization: Not on file     Attends meetings of clubs or organizations: Not on file     Relationship status: Not on file    Intimate partner violence     Fear of current or ex partner: Not on file     Emotionally abused: Not on file     Physically abused: Not on file     Forced sexual activity: Not on file   Other Topics Concern    Not on file   Social History Narrative    Not on file         ALLERGIES: Benicar [olmesartan]; Amlodipine; Lisinopril; and Sulfa (sulfonamide antibiotics)    Review of Systems   Constitutional: Negative for chills and fever. Gastrointestinal: Negative for abdominal pain, diarrhea, nausea and vomiting. Genitourinary: Positive for dysuria and frequency. Negative for vaginal bleeding, vaginal discharge and vaginal pain. Vitals:    11/11/20 0239   BP: (!) 164/94   Pulse: 88   Resp: 18   Temp: 98.1 °F (36.7 °C)   SpO2: 97%   Weight: 72.6 kg (160 lb)   Height: 5' 1\" (1.549 m)            Physical Exam  Vitals signs and nursing note reviewed. Constitutional:       Appearance: Normal appearance. Abdominal:      General: Abdomen is flat. Bowel sounds are normal.      Palpations: Abdomen is soft. Tenderness: There is abdominal tenderness. There is no guarding or rebound.       Comments: Suprapubic ttp   Neurological:      Mental Status: She is alert. MDM  Number of Diagnoses or Management Options  Diagnosis management comments: We will start with a urine and pregnancy test.    ED Course as of Nov 11 0314   Wed Nov 11, 2020 0312 Urine had findings consistent with a UTI.   I discussed with her potential alternative diagnoses and encouraged her to get reevaluated if she does not improve.    [AC]      ED Course User Index  [AC] Anaya Yanes MD       Procedures

## 2020-11-12 ENCOUNTER — PATIENT OUTREACH (OUTPATIENT)
Dept: CASE MANAGEMENT | Age: 50
End: 2020-11-12

## 2020-11-12 LAB
BACTERIA SPEC CULT: NORMAL
BACTERIA SPEC CULT: NORMAL
SERVICE CMNT-IMP: NORMAL

## 2020-11-12 NOTE — PROGRESS NOTES
Patient contacted regarding recent discharge and COVID-19 risk. Ambulatory Care Manager contacted the patient by telephone to perform post discharge assessment. Verified name and  with patient as identifiers. Patient has following risk factors of: no known risk factors. ACM reviewed discharge instructions, medical action plan and red flags related to discharge diagnosis. Reviewed and educated them on any new and changed medications related to discharge diagnosis. Advised obtaining a 90-day supply of all daily and as-needed medications. Advance Care Planning:   Does patient have an Advance Directive: health care decision makers updated    Education provided regarding infection prevention, and signs and symptoms of COVID-19 and when to seek medical attention with patient who verbalized understanding. Discussed exposure protocols and quarantine from [address] you at higher risk for severe illness  and given an opportunity for questions and concerns. The patient agrees to contact the COVID-19 hotline [phone] or PCP office for questions related to their healthcare. ACM provided contact information for future reference. From CDC: Are you at higher risk for severe illness?  Wash your hands often.  Avoid close contact (6 feet, which is about two arm lengths) with people who are sick.  Put distance between yourself and other people if COVID-19 is spreading in your community.  Clean and disinfect frequently touched surfaces.  Avoid all cruise travel and non-essential air travel.  Call your healthcare professional if you have concerns about COVID-19 and your underlying condition or if you are sick.     For more information on steps you can take to protect yourself, see CDC's How to Protect Yourself      Patient/family/caregiver given information for Alida Wen and agrees to enroll yes  Plan for follow-up call in 7-14 days based on severity of symptoms and risk factors.

## 2020-11-24 ENCOUNTER — PATIENT OUTREACH (OUTPATIENT)
Dept: CASE MANAGEMENT | Age: 50
End: 2020-11-24

## 2021-01-28 PROBLEM — N95.1 PERIMENOPAUSE: Status: ACTIVE | Noted: 2021-01-28

## 2021-01-28 PROBLEM — R23.2 HOT FLASHES: Status: ACTIVE | Noted: 2021-01-28

## 2021-03-04 ENCOUNTER — TRANSCRIBE ORDER (OUTPATIENT)
Dept: SCHEDULING | Age: 51
End: 2021-03-04

## 2021-03-04 DIAGNOSIS — Z12.31 SCREENING MAMMOGRAM FOR HIGH-RISK PATIENT: Primary | ICD-10-CM

## 2021-03-10 ENCOUNTER — HOSPITAL ENCOUNTER (OUTPATIENT)
Dept: MAMMOGRAPHY | Age: 51
Discharge: HOME | End: 2021-03-10
Attending: FAMILY MEDICINE
Payer: COMMERCIAL

## 2021-03-10 DIAGNOSIS — Z12.31 SCREENING MAMMOGRAM FOR HIGH-RISK PATIENT: ICD-10-CM

## 2021-03-10 PROCEDURE — 77063 BREAST TOMOSYNTHESIS BI: CPT

## 2021-06-23 NOTE — ED TRIAGE NOTES
Pt arrives ambulating to triage, mask in place, with c/o probable UTI x 2 days. Describes symptoms as burning on urination, seeing blood in urine, and right flank pain. Pt has tried OTC medication without effect. Pt denies NVD, CP, HA, dizziness, or SOB. Pt states she has had UTIs in the past and this feels similar. fever/chills/malaise/fatigue/weakness

## 2021-11-11 ENCOUNTER — HOSPITAL ENCOUNTER (OUTPATIENT)
Dept: ULTRASOUND IMAGING | Age: 51
Discharge: HOME | End: 2021-11-11
Attending: FAMILY MEDICINE
Payer: COMMERCIAL

## 2021-11-11 DIAGNOSIS — I73.9 CLAUDICATION (HCC): ICD-10-CM

## 2021-11-11 PROCEDURE — 93925 LOWER EXTREMITY STUDY: CPT

## 2021-11-12 NOTE — PROGRESS NOTES
Dopplers are negative for clots but she does have a Baker's cyst in the left knee. Can refer to orthopedics.

## 2022-03-08 PROBLEM — G89.29 CHRONIC PAIN OF RIGHT KNEE: Status: ACTIVE | Noted: 2022-03-08

## 2022-03-08 PROBLEM — M25.561 CHRONIC PAIN OF RIGHT KNEE: Status: ACTIVE | Noted: 2022-03-08

## 2022-03-08 PROBLEM — M71.21 BAKER'S CYST OF KNEE, RIGHT: Status: ACTIVE | Noted: 2022-03-08

## 2022-03-18 PROBLEM — R10.9 FLANK PAIN: Status: ACTIVE | Noted: 2018-09-17

## 2022-03-18 PROBLEM — M62.838 MUSCLE SPASM: Status: ACTIVE | Noted: 2018-05-02

## 2022-03-18 PROBLEM — J30.1 SEASONAL ALLERGIC RHINITIS DUE TO POLLEN: Status: ACTIVE | Noted: 2018-05-02

## 2022-03-19 PROBLEM — M71.21 BAKER'S CYST OF KNEE, RIGHT: Status: ACTIVE | Noted: 2022-03-08

## 2022-03-19 PROBLEM — Z87.42 HX OF ABNORMAL CERVICAL PAPANICOLAOU SMEAR: Status: ACTIVE | Noted: 2017-02-13

## 2022-03-19 PROBLEM — R23.2 HOT FLASHES: Status: ACTIVE | Noted: 2021-01-28

## 2022-03-19 PROBLEM — N91.2 AMENORRHEA: Status: ACTIVE | Noted: 2020-08-01

## 2022-03-19 PROBLEM — G89.29 CHRONIC PAIN OF RIGHT KNEE: Status: ACTIVE | Noted: 2022-03-08

## 2022-03-19 PROBLEM — M25.561 CHRONIC PAIN OF RIGHT KNEE: Status: ACTIVE | Noted: 2022-03-08

## 2022-03-19 PROBLEM — Z91.018 FOOD ALLERGY: Status: ACTIVE | Noted: 2018-05-01

## 2022-03-20 PROBLEM — Z87.442 HISTORY OF KIDNEY STONES: Status: ACTIVE | Noted: 2018-08-28

## 2022-03-20 PROBLEM — R73.03 PREDIABETES: Status: ACTIVE | Noted: 2017-01-10

## 2022-03-20 PROBLEM — R10.2 PELVIC PAIN: Status: ACTIVE | Noted: 2020-08-01

## 2022-03-20 PROBLEM — N95.1 PERIMENOPAUSE: Status: ACTIVE | Noted: 2021-01-28

## 2022-04-06 ENCOUNTER — HOSPITAL ENCOUNTER (OUTPATIENT)
Dept: PHYSICAL THERAPY | Age: 52
Discharge: HOME | End: 2022-04-06
Attending: SPECIALIST/TECHNOLOGIST
Payer: COMMERCIAL

## 2022-04-06 DIAGNOSIS — G89.29 CHRONIC PAIN OF RIGHT KNEE: ICD-10-CM

## 2022-04-06 DIAGNOSIS — M25.561 CHRONIC PAIN OF RIGHT KNEE: ICD-10-CM

## 2022-04-06 PROCEDURE — 97110 THERAPEUTIC EXERCISES: CPT

## 2022-04-06 PROCEDURE — 97140 MANUAL THERAPY 1/> REGIONS: CPT

## 2022-04-06 PROCEDURE — 97161 PT EVAL LOW COMPLEX 20 MIN: CPT

## 2022-04-06 NOTE — THERAPY EVALUATION
Tatianna Pelayo  : 1970  Primary: [facility]*  Secondary:  Therapy Center at [address], [address] [place] [address], [place], [address] [place] [address]  [phone]   [phone]       OUTPATIENT PHYSICAL THERAPY:Initial Assessment 2022    ICD-10: Treatment Diagnosis: Right knee pain [M25.561]                Treatment Diagnosis 2: Other chronic pain [G89.29]                Treatment Diagnosis 3: Difficulty walking, not elsewhere classified [R26.2]  Precautions: DM, h/o fall. Allergies: Benicar [olmesartan], Amlodipine, Lisinopril, and Sulfa (sulfonamide antibiotics)  TREATMENT PLAN:  Effective Dates: 2022 TO 2022. Frequency/Duration: 2 times a week for 8 weeks. MEDICAL/REFERRING DIAGNOSIS:  Chronic pain of right knee [M25.561, G89.29]  DATE OF ONSET: 2021, injured from fall. REFERRING PHYSICIAN: Padmini White NP MD Orders: Evaluate and Treat   Return MD Appointment: TBD by patient. INITIAL ASSESSMENT:  Ms. Chinyere Messina is a 47 y.o. female presenting to physical therapy with complaints of right knee pain chronic in nature with original onset of symptoms from fall while out walking her dog 2021. Per pt fell in man hole causing injury which patient reports originally was worse in left knee however over the course of last year progressed to right knee pain being primary complaint. Pt reports difficulty with walking, driving and prolonged sitting at this time. Pt reports intermittent swelling in right knee with activity. Pt has seen orthopedic MD who performed x-ray which showed mild right knee arthritic changes. MRI per MD report indicating mild MCL sprain. Pt referred by MD for PT eval and treat. Pt will benefit from skilled PT treatment to address deficits in strength, AROM, balance, gait and functional mobility in order to return to prior level of function and improve quality of life. Felipe Lugo       PROBLEM LIST (Impacting functional limitations):  Decreased Strength  Decreased ADL/Functional Activities  Decreased Transfer Abilities  Decreased Ambulation Ability/Technique  Decreased Balance  Increased Pain  Decreased Activity Tolerance  Decreased Flexibility/Joint Mobility  Edema/Girth  Decreased [place] with Home Exercise Program INTERVENTIONS PLANNED: (Treatment may consist of any combination of the following)  Balance Exercise  Bed Mobility  Cold  Electrical Stimulation  Family Education  Gait Training  Heat  Home Exercise Program (HEP)  Iontophoresis  Manual Therapy  Neuromuscular Re-education/Strengthening  Range of Motion (ROM)  Therapeutic Activites  Therapeutic Exercise/Strengthening  Transcutaneous Electrical Nerve Stimulation (TENS)  Transfer Training  Ultrasound (US)     GOALS: (Goals have been discussed and agreed upon with patient.)  Short-Term Functional Goals: Time Frame: 4/6/2022 to 5/4/2022. Patient demonstrates independence with home exercise program without verbal cueing provided by therapist.  Pt will reports worst pain 3/10 or less at rest in order to return to unrestricted prolonged sitting 30 mins or greater. Pt will demonstrate right knee passive extension to 0 degrees or greater in order to progress toward normalized gait pattern. Discharge Goals: Time Frame: 4/6/2022 to 6/1/2022. Pt will report worst pain 3/10 or less with prolonged standing/walking 15 mins or greater in order to return to unrestricted community distance ambulation. Pt will demonstrate right knee AROM 0-125 degrees or greater in order to return to independent functional transfers. Pt will demonstrate gross RLE strength to 4/5 or greater all planes in order to improve stair navigation and autombile transfer ability. LEFS score of 45/80 or greater in order to demonstrate overall functional improvement. Outcome Measure:    Tool Used: Lower Extremity Functional Scale (LEFS)  Score:  Initial: 34/80 Most Recent: X/80 (Date: -- )   Interpretation of Score: 20 questions each scored on a 5 point scale with 0 representing \"extreme difficulty or unable to perform\" and 4 representing \"no difficulty\". The lower the score, the greater the functional disability. 80/80 represents no disability. Minimal detectable change is 9 points. Medical Necessity:   Patient is expected to demonstrate progress in strength, range of motion, balance, coordination and functional technique to increase independence with community distance ambulation, driving for work, functional transfers and light house/yard work. .  Skilled intervention continues to be required due to decreased AROM, decreased strength, decreased balance, decreased gait, decreased functional mobility. .  Reason for Services/Other Comments:  Patient continues to require skilled intervention due to increasing complexity of exercise. .  Total Evaluation Duration: 15 minutes    Rehabilitation Potential For Stated Goals: Good  Regarding Lizbeth Kunz's therapy, I certify that the treatment plan above will be carried out by a therapist or under their direction. Thank you for this referral,  Pj Harmon, PT   DPT  Referring Physician Signature: Xiomara Reeves NP _______________________________ Date _____________             PAIN/SUBJECTIVE:    Initial: Pain Intensity 1: 6  Pain Location 1: Knee  Pain Orientation 1: Right  Post Session:  5/10    HISTORY:    History of Injury/Illness (Reason for Referral):  Ms. Víctor Ross is a 47 y.o. female presenting to physical therapy with complaints of right knee pain chronic in nature with original onset of symptoms from fall while out walking her dog July 2021. Per pt fell in man hole causing injury which patient reports originally was worse in left knee however over the course of last year progressed to right knee pain being primary complaint. Pt reports difficulty with walking, driving and prolonged sitting at this time.  Pt reports intermittent swelling in right knee with activity. Pt has seen orthopedic MD who performed x-ray which showed mild right knee arthritic changes. MRI per MD report indicating mild MCL sprain. Past Medical History/Comorbidities:   Ms. Michel Loomis  has a past medical history of Abnormal Papanicolaou smear of cervix, Anemia, Dyspepsia (12/22/2014), Encounter for long-term (current) use of other medications (9/24/2015), Fibroid, uterine, GERD (gastroesophageal reflux disease), Heavy menstrual bleeding (1/26/2015), History of kidney stones (8/28/2018), Hypertension, Kidney stone, Ovarian cyst, Type II or unspecified type diabetes mellitus without mention of complication, not stated as uncontrolled (09/25/2015), Unspecified vitamin D deficiency (11/20/2014), Uterine fibroid (1/26/2015), UTI (urinary tract infection), and Vaginitis, atrophic. She has no past medical history of Adverse effect of anesthesia, Congestive heart failure, unspecified, Contact dermatitis and other eczema, due to unspecified cause, Dementia, Depression, Difficult intubation, Headache, History of abuse, Hypercholesterolemia, Infectious disease, Malignant hyperthermia due to anesthesia, Nausea & vomiting, Neurological disorder, PCOS (polycystic ovarian syndrome), PID (pelvic inflammatory disease), Preeclampsia, Pseudocholinesterase deficiency, Psychotic disorder ([facility] [address] Sleep disorder, Trauma, or Unspecified adverse effect of anesthesia. Ms. Michel Loomis  has a past surgical history that includes fragment kidney stone/ eswl; pr lap,tubal cautery; and hx tubal ligation. Social History/Living Environment:     Lives alone. Prior Level of Function/Work/Activity:  Prior level of function includes independent community distance ambulation, independent functional transfers, independent driving and light house/yard work.       Ambulatory/Rehab Services H2 Model Falls Risk Assessment    Risk Factors:       No Risk Factors Identified Ability to Rise from Chair:       (1)  Pushes up, successful in one attempt    Falls Prevention Plan:       No modifications necessary    Total: (5 or greater = High Risk): 1    ©2010 [facility] of "[employer]. All Rights Reserved. [facility] States Patent #[id number],259. Federal Law prohibits the replication, distribution or use without written permission from [facility] [employer]    Current Medications:        Current Outpatient Medications:     methylPREDNISolone (MEDROL DOSEPACK) 4 mg tablet, Take as directed on packaging, Disp: 1 Dose Pack, Rfl: 0    magnesium carbonate (MAGONATE) 54 mg/5 mL liqd oral liquid, Take 10 mL by mouth daily. , Disp: 300 mL, Rfl: 5    meloxicam (MOBIC) 15 mg tablet, Take 1 Tablet by mouth daily. Indications: knee pain, Disp: 30 Tablet, Rfl: 5    chlorthalidone (HYGROTON) 25 mg tablet, Take 1 Tablet by mouth daily. Indications: visible water retention, high blood pressure, Disp: 90 Tablet, Rfl: 3    tiZANidine (ZANAFLEX) 2 mg tablet, Take 1 Tablet by mouth two (2) times daily as needed for Muscle Spasm(s). Indications: muscle spasm, Disp: 90 Tablet, Rfl: 3    ergocalciferol (Vitamin D2) 1,250 mcg (50,000 unit) capsule, Take 1 Capsule by mouth every seven (7) days. Indications: low vitamin D levels, Disp: 12 Capsule, Rfl: 3    levocetirizine (XYZAL) 5 mg tablet, Take 1 Tablet by mouth daily. Indications: seasonal runny nose, Disp: 90 Tablet, Rfl: 3    fluticasone propionate (Flonase Allergy Relief) 50 mcg/actuation nasal spray, 2 Sprays by Both Nostrils route daily.  Indications: inflammation of the nose due to an allergy, Disp: 3 Each, Rfl: 3    Blood-Glucose Meter monitoring kit, USE TO CHECK BLOOD SUGARS 2x DAILY, Disp: 1 Kit, Rfl: 0    lancets 28 gauge misc, Use to check blood sugars 2 x day, Disp: 100 Lancet, Rfl: 11    glucose blood VI test strips (blood glucose test) strip, USE TO CHECK BLOOD SUGARS 2x DAILY, Disp: 100 Strip, Rfl: 11    Date Last Reviewed:  4/6/2022    Number of Personal Factors/Comorbidities that affect the Plan of Care: 1-2: MODERATE COMPLEXITY    EXAMINATION:      Observation/Postural and Gait Assessment: Gait: Severe antalgic gait pattern observed with use of AD, decreased knee extension at heel strike observed RLE. Palpation: Tenderness to posterior knee, hamstring/gastroc muscles. ROM:     AROM(PROM) Left Right   Knee flexion 130° 110°   Knee extension -2° Lacking 7°     Passive Accessory Mobility Testing: Patella: painful and hypomobile medial and superior glides. Strength:     Manual Muscle Test (out of 5) Left Right   Knee extension 5 4-   Knee flexion 4 3+   Hip abduction 4- 3+     Special Tests:  Unremarkable, poor tolerance to special testing due to pain. Neurological Screen:  Myotomes: Key muscle strength testing for bilateral LE is WNL. Dermatomes: Sensation testing through bilateral LE for light touch is WNL. Reflexes: Patellar (L4) and achilles (S1) are WNL and WNL. Functional Mobility:  Sit to stand: severe left weight shift and definite use of BUE support. Step up/down: unable due to pain. Body Structures Involved:  Nerves  Bones  Joints  Muscles  Ligaments Body Functions Affected:  Neuromusculoskeletal  Movement Related Activities and Participation Affected:   Mobility  Self Care  Domestic Life  Interpersonal Interactions and Relationships  Community, Social and [place]    Number of elements (examined above) that affect the Plan of Care: 4+: HIGH COMPLEXITY    CLINICAL PRESENTATION:    Presentation: Evolving clinical presentation with changing clinical characteristics: MODERATE COMPLEXITY    CLINICAL DECISION MAKING:    Use of outcome tool(s) and clinical judgement create a POC that gives a: Clear prediction of patient's progress: LOW COMPLEXITY

## 2022-04-06 NOTE — PROGRESS NOTES
Clint Silva  : 1970  Primary: [facility]*  Secondary:  Therapy Center at [facility]  [address], [address] [facility], [place], [address] [place] [address]  [phone]   [phone]      OUTPATIENT PHYSICAL THERAPY: Daily Treatment Note 2022    ICD-10: Treatment Diagnosis: Right knee pain [M25.561]                Treatment Diagnosis 2: Other chronic pain [G89.29]                Treatment Diagnosis 3: Difficulty walking, not elsewhere classified [R26.2]  Precautions: DM, h/o fall. Allergies: Benicar [olmesartan], Amlodipine, Lisinopril, and Sulfa (sulfonamide antibiotics)  TREATMENT PLAN:  Effective Dates: 2022 TO 2022. Frequency/Duration: 2 times a week for 8 weeks. MEDICAL/REFERRING DIAGNOSIS:  Chronic pain of right knee [M25.561, G89.29]  DATE OF ONSET: 2021, injured from fall. REFERRING PHYSICIAN: Ifeanyi York NP MD Orders: Evaluate and Treat   Return MD Appointment: TBD by patient. Pre-treatment Symptoms/Complaints:  Pt reports knee pain has improved somewhat since injury however pain levels remain moderate to severe depending on activity. Pain: Initial: Pain Intensity 1: 6  Pain Location 1: Knee  Pain Orientation 1: Right  Post Session:  4/10   Medications Last Reviewed:  2022  Updated Objective Findings:  See evaluation note from today   TREATMENT:   THERAPEUTIC EXERCISE: (23 minutes):  Exercises per grid below to improve mobility, strength, balance and coordination. Required moderate visual, verbal, manual and tactile cues to promote proper body alignment, promote proper body posture and promote proper body mechanics. Progressed resistance, range, repetitions and complexity of movement as indicated.      Date:  2022 Date:   Date:     Activity/Exercise Parameters Parameters Parameters   Quad set Towel roll under knee for comfort, [id number]     Heel slide Supine with strap, x10     Gastroc stretch Supine with strap, 3x30     Ankle pumps Seated, x20                         Time spent with patient reviewing proper muscle recruitment and technique with exercises. MANUAL THERAPY: (15 minutes): Joint mobilization, Soft tissue mobilization and PROM, manual stretching was utilized and necessary because of the patient's restricted joint motion, painful spasm, loss of articular motion and restricted motion of soft tissue  Soft tissue mobilization: popliteal region, hamstring, gastroc to tolerance. Joint mobs: patella to tolerance all planes. PROM: knee extension gentle oscillations to improve ROM. MODALITIES: (10 minutes, no charge): *  Cold Pack Therapy in order to provide analgesia, relieve muscle spasm and reduce inflammation and edema. HEP: As above; handouts given to patient for all exercises. Treatment/Session Summary:    Response to Treatment:  Fair tolerance to manual and therex interventions provided today; improve gait mechanics and decreased antalgic pattern observed by end of treatment. .  Communication/Consultation:  HEP handout provided. Equipment provided today:  None today  Recommendations/Intent for next treatment session: Next visit will focus on pain control, improve right knee mobility, improve RLE strength, progress to gait/balance training. Total Treatment Billable Duration:  53 minutes: 15 evaluation, 23 therex, 15 manual therapy.   PT Patient Time In/Time Out  Time In: 0238  Time Out: [id number] Cem Turner, PT

## 2022-04-12 ENCOUNTER — HOSPITAL ENCOUNTER (OUTPATIENT)
Dept: PHYSICAL THERAPY | Age: 52
Discharge: HOME | End: 2022-04-12
Attending: SPECIALIST/TECHNOLOGIST
Payer: COMMERCIAL

## 2022-04-12 PROCEDURE — 97110 THERAPEUTIC EXERCISES: CPT

## 2022-04-12 PROCEDURE — 97140 MANUAL THERAPY 1/> REGIONS: CPT

## 2022-04-12 NOTE — PROGRESS NOTES
Tanvi Green  : 1970  Primary: Eulalio Tyler Essentials*  Secondary:  [facility] at [facility]  [address], [address] [place] [address] Avenue, [place], [address] [place] [address]  [phone]   [phone]      OUTPATIENT PHYSICAL THERAPY: Daily Treatment Note 2022    ICD-10: Treatment Diagnosis: Right knee pain [M25.561]                Treatment Diagnosis 2: Other chronic pain [G89.29]                Treatment Diagnosis 3: Difficulty walking, not elsewhere classified [R26.2]  Precautions: DM, h/o fall. Allergies: Benicar [olmesartan], Amlodipine, Lisinopril, and Sulfa (sulfonamide antibiotics)  TREATMENT PLAN:  Effective Dates: 2022 TO 2022. Frequency/Duration: 2 times a week for 8 weeks. MEDICAL/REFERRING DIAGNOSIS:  Chronic pain of right knee [M25.561, G89.29]  DATE OF ONSET: 2021, injured from fall. REFERRING PHYSICIAN: Danay Lee NP MD Orders: Evaluate and Treat   Return MD Appointment: TBD by patient. Pre-treatment Symptoms/Complaints:  Pt reports minimal knee pain today however pain increases with certain movements and activities. .    Pain: Initial: Pain Intensity 1: 2  Pain Location 1: Knee  Pain Orientation 1: Right,Medial  Post Session:  2/10   Medications Last Reviewed:  2022  Updated Objective Findings:  None Today   TREATMENT:   THERAPEUTIC EXERCISE: (30 minutes):  Exercises per grid below to improve mobility, strength, balance and coordination. Required moderate visual, verbal, manual and tactile cues to promote proper body alignment, promote proper body posture and promote proper body mechanics. Progressed resistance, range, repetitions and complexity of movement as indicated.      Date:  2022 Date:  22 Date:     Activity/Exercise Parameters Parameters Parameters   Quad set Towel roll under knee for comfort, [id number] Pillow under knee x 20    Heel slide Supine with strap, x10 Supine strap 2 x 10    Gastroc stretch Supine with strap, 3x30 Strap x 5    Ankle pumps Seated, x20 2 x 20    Straight leg raise  2 x 10    Calf raises  2 x 10    Weight shifts  10 x 5 sec      Time spent with patient reviewing proper muscle recruitment and technique with exercises. MANUAL THERAPY: (15 minutes): Joint mobilization, Soft tissue mobilization and PROM, manual stretching was utilized and necessary because of the patient's restricted joint motion, painful spasm, loss of articular motion and restricted motion of soft tissue  Soft tissue mobilization: popliteal region, hamstring, gastroc to tolerance. Joint mobs: patella to tolerance all planes. PROM: knee extension gentle oscillations to improve ROM. MODALITIES: (5 minutes, no charge): *  Cold Pack Therapy in order to provide analgesia, relieve muscle spasm and reduce inflammation and edema. HEP: As above; handouts given to patient for all exercises. Treatment/Session Summary:    Response to Treatment:  Intermittent sharp medial knee pain. Communication/Consultation:  HEP  Equipment provided today:  None today  Recommendations/Intent for next treatment session: Next visit will focus on pain control, improve right knee mobility, improve RLE strength, progress to gait/balance training.     Total Treatment Billable Duration:  45 minutes:   PT Patient Time In/Time Out  Time In: 1000  Time Out: Julian 50, PTA

## 2022-04-14 ENCOUNTER — HOSPITAL ENCOUNTER (OUTPATIENT)
Dept: PHYSICAL THERAPY | Age: 52
Discharge: HOME | End: 2022-04-14
Attending: SPECIALIST/TECHNOLOGIST
Payer: COMMERCIAL

## 2022-04-14 PROCEDURE — 97140 MANUAL THERAPY 1/> REGIONS: CPT

## 2022-04-14 PROCEDURE — 97110 THERAPEUTIC EXERCISES: CPT

## 2022-04-14 NOTE — PROGRESS NOTES
Mindi Plants  : 1970  Primary: Jaycee Lowery Essentials*  Secondary:  [facility] at [facility]  [address], [address] [facility] [address] [place], [place], [address] [place] [address]  [phone]   [phone]      OUTPATIENT PHYSICAL THERAPY: Daily Treatment Note 2022    ICD-10: Treatment Diagnosis: Right knee pain [M25.561]                Treatment Diagnosis 2: Other chronic pain [G89.29]                Treatment Diagnosis 3: Difficulty walking, not elsewhere classified [R26.2]  Precautions: DM, h/o fall. Allergies: Benicar [olmesartan], Amlodipine, Lisinopril, and Sulfa (sulfonamide antibiotics)  TREATMENT PLAN:  Effective Dates: 2022 TO 2022. Frequency/Duration: 2 times a week for 8 weeks. MEDICAL/REFERRING DIAGNOSIS:  Chronic pain of right knee [M25.561, G89.29]  DATE OF ONSET: 2021, injured from fall. REFERRING PHYSICIAN: David Julio NP MD Orders: Evaluate and Treat   Return MD Appointment: TBD by patient. Pre-treatment Symptoms/Complaints:  Pt reported standing for a prolonged time causes increased pain. Pain: Initial: Pain Intensity 1: 2  Pain Location 1: Knee  Pain Orientation 1: Medial,Right  Post Session:  2/10   Medications Last Reviewed:  2022  Updated Objective Findings:  Pt. had increased sensitivity on the medial posterior aspect of right knee with manual.   TREATMENT:   THERAPEUTIC EXERCISE: (38 minutes):  Exercises per grid below to improve mobility, strength, balance and coordination. Required moderate visual, verbal, manual and tactile cues to promote proper body alignment, promote proper body posture and promote proper body mechanics. Progressed resistance, range, repetitions and complexity of movement as indicated.      Date:  2022 Date:  22 Date:  22   Activity/Exercise Parameters Parameters Parameters   Nu step    Level 1 x 12 mins    Clam shells   2x10 reps each LE                     Quad set Towel roll under knee for comfort, [id number] Pillow under knee x 20 X 20 reps 5 sec hold    Heel slide Supine with strap, x10 Supine strap 2 x 10 Supine strap 2x10    Gastroc stretch Supine with strap, 3x30 Strap x 5 Strap 4x30 sec hold    Ankle pumps Seated, x20 2 x 20    Straight leg raise  2 x 10    Calf raises  2 x 10    Weight shifts  10 x 5 sec      Time spent with patient reviewing proper muscle recruitment and technique with exercises. MANUAL THERAPY: (15 minutes): Joint mobilization, Soft tissue mobilization and PROM, manual stretching was utilized and necessary because of the patient's restricted joint motion, painful spasm, loss of articular motion and restricted motion of soft tissue  Soft tissue mobilization: popliteal region, hamstring, gastroc to tolerance. Joint mobs: patella to tolerance all planes. PROM: knee extension gentle oscillations to improve ROM. MODALITIES: (5 minutes, no charge): *  Cold Pack Therapy in order to provide analgesia, relieve muscle spasm and reduce inflammation and edema. HEP: As above; handouts given to patient for all exercises. Treatment/Session Summary:    Response to Treatment:  Intermittent sharp medial knee pain. Communication/Consultation:  HEP  Equipment provided today:  None today  Recommendations/Intent for next treatment session: Next visit will focus on pain control, improve right knee mobility, improve RLE strength, progress to gait/balance training.     Total Treatment Billable Duration:  53 minutes:   PT Patient Time In/Time Out  Time In: 1000  Time Out: [address] [facility]

## 2022-04-20 ENCOUNTER — HOSPITAL ENCOUNTER (OUTPATIENT)
Dept: PHYSICAL THERAPY | Age: 52
Discharge: HOME | End: 2022-04-20
Attending: SPECIALIST/TECHNOLOGIST
Payer: COMMERCIAL

## 2022-04-20 PROCEDURE — 97140 MANUAL THERAPY 1/> REGIONS: CPT

## 2022-04-20 PROCEDURE — 97110 THERAPEUTIC EXERCISES: CPT

## 2022-04-20 NOTE — PROGRESS NOTES
Mindi Plants  : 1970  Primary: Jaycee Lowery Essentials*  Secondary:  [facility] at [facility]  [address], [address] [place] [address], [place], [address] [place] [address]  [phone]   [phone]      OUTPATIENT PHYSICAL THERAPY: Daily Treatment Note 2022    ICD-10: Treatment Diagnosis: Right knee pain [M25.561]                Treatment Diagnosis 2: Other chronic pain [G89.29]                Treatment Diagnosis 3: Difficulty walking, not elsewhere classified [R26.2]  Precautions: DM, h/o fall. Allergies: Benicar [olmesartan], Amlodipine, Lisinopril, and Sulfa (sulfonamide antibiotics)  TREATMENT PLAN:  Effective Dates: 2022 TO 2022. Frequency/Duration: 2 times a week for 8 weeks. MEDICAL/REFERRING DIAGNOSIS:  Chronic pain of right knee [M25.561, G89.29]  DATE OF ONSET: 2021, injured from fall. REFERRING PHYSICIAN: David Julio NP MD Orders: Evaluate and Treat   Return MD Appointment: TBD by patient. Pre-treatment Symptoms/Complaints:  Pt reports increased pain following previous visit however symptoms resolved by last weekend. Pain: Initial: Pain Intensity 1: 1  Pain Location 1: Knee  Pain Orientation 1: Right  Post Session:  0/10   Medications Last Reviewed:  2022  Updated Objective Findings:  Palpation: tightness and tenderness reported popliteal region and posterolateral knee. TREATMENT:   THERAPEUTIC EXERCISE: (38 minutes):  Exercises per grid below to improve mobility, strength, balance and coordination. Required moderate visual, verbal, manual and tactile cues to promote proper body alignment, promote proper body posture and promote proper body mechanics. Progressed resistance, range, repetitions and complexity of movement as indicated.      Date:  2022 Date:  22 Date:  22   Activity/Exercise Parameters Parameters Parameters   Nu step  Level 1, 10 mins  Level 1 x 12 mins    Clam shells 2x10  2x10 reps each LE   Quad set Towel under ankle, [id number] Pillow under knee x 20 X 20 reps 5 sec hold    Heel slide Supine with strap, 2x10 Supine strap 2 x 10 Supine strap 2x10    Gastroc stretch Slantboard, [id number] Strap x 5 Strap 4x30 sec hold    Ankle pumps Seated, x20 2 x 20    Straight leg raise 2x10 2 x 10    Calf raises -- 2 x 10    Weight shifts 10x5 sec 10 x 5 sec      Time spent with patient reviewing proper muscle recruitment and technique with exercises. MANUAL THERAPY: (15 minutes): Joint mobilization, Soft tissue mobilization and PROM, manual stretching was utilized and necessary because of the patient's restricted joint motion, painful spasm, loss of articular motion and restricted motion of soft tissue  Soft tissue mobilization: popliteal region, hamstring, gastroc to tolerance. Joint mobs: patella to tolerance all planes. PROM: knee extension gentle oscillations to improve ROM. MODALITIES: (5 minutes, no charge): *  Cold Pack Therapy in order to provide analgesia, relieve muscle spasm and reduce inflammation and edema. HEP: As above; handouts given to patient for all exercises. Treatment/Session Summary:    Response to Treatment:  Good tolerance to exercises performed today. No increased discomfort reported at end of treatment. .  Communication/Consultation:  None today  Equipment provided today:  None today  Recommendations/Intent for next treatment session: Next visit will focus on pain control, improve right knee mobility, improve RLE strength, progress to gait/balance training.     Total Treatment Billable Duration:  53 minutes:   PT Patient Time In/Time Out  Time In: 1232  Time Out: [place], [place]

## 2022-04-22 ENCOUNTER — HOSPITAL ENCOUNTER (OUTPATIENT)
Dept: PHYSICAL THERAPY | Age: 52
Discharge: HOME | End: 2022-04-22
Attending: SPECIALIST/TECHNOLOGIST
Payer: COMMERCIAL

## 2022-04-22 PROCEDURE — 97110 THERAPEUTIC EXERCISES: CPT

## 2022-04-22 PROCEDURE — 97140 MANUAL THERAPY 1/> REGIONS: CPT

## 2022-04-22 NOTE — PROGRESS NOTES
Chu Arias  : 1970  Primary: Francesca Matt Essentials*  Secondary:  [facility] at [facility]  [address], [address] [facility], [place], [address] [place] [address]  [phone]   [phone]      OUTPATIENT PHYSICAL THERAPY: Daily Treatment Note 2022    ICD-10: Treatment Diagnosis: Right knee pain [M25.561]                Treatment Diagnosis 2: Other chronic pain [G89.29]                Treatment Diagnosis 3: Difficulty walking, not elsewhere classified [R26.2]  Precautions: DM, h/o fall. Allergies: Benicar [olmesartan], Amlodipine, Lisinopril, and Sulfa (sulfonamide antibiotics)  TREATMENT PLAN:  Effective Dates: 2022 TO 2022. Frequency/Duration: 2 times a week for 8 weeks. MEDICAL/REFERRING DIAGNOSIS:  Chronic pain of right knee [M25.561, G89.29]  DATE OF ONSET: 2021, injured from fall. REFERRING PHYSICIAN: Jennifer Hoffman NP MD Orders: Evaluate and Treat   Return MD Appointment: TBD by patient. Pre-treatment Symptoms/Complaints:  Pt reported hardly no pain just tightness. Pain: Initial: Pain Intensity 1: 2  Pain Location 1: Knee  Pain Orientation 1: Right  Post Session:  0/10   Medications Last Reviewed:  2022  Updated Objective Findings:  Palpation: Less tightness after session   TREATMENT:   THERAPEUTIC EXERCISE: (38 minutes):  Exercises per grid below to improve mobility, strength, balance and coordination. Required moderate visual, verbal, manual and tactile cues to promote proper body alignment, promote proper body posture and promote proper body mechanics. Progressed resistance, range, repetitions and complexity of movement as indicated.      Date:  2022 Date:  22 Date:  22   Activity/Exercise Parameters Parameters Parameters   Nu step  Level 1, 10 mins Level 4 x 10 mins Level 1 x 12 mins    Clam shells 2x10 2x10 reps each LE 2x10 reps each LE   Quad set Towel under ankle, [id number] X 20 reps 5 sec hold  X 20 reps 5 sec hold Heel slide Supine with strap, 2x10 Supine strap 2 x 10 Supine strap 2x10    Gastroc stretch Slantboard, [id number] Strap x 5 Strap 4x30 sec hold    Ankle pumps Seated, x20 2 x 20 X 40 reps supine   Straight leg raise 2x10 2 x 10 2x10 reps   Calf raises -- 2 x 10 2x10    Weight shifts 10x5 sec 10 x 5 sec X 20 reps    Standing hip flexion  X 20 reps  2x10 at bar    Standing hamstring curls   X 20 reps BLE's at bar X 20 reps BLE at bar   Standing hip abduction  2x10 reos BLE's  2x10 reps BLE's at bar                  Time spent with patient reviewing proper muscle recruitment and technique with exercises. MANUAL THERAPY: (15 minutes): Joint mobilization, Soft tissue mobilization and PROM, manual stretching was utilized and necessary because of the patient's restricted joint motion, painful spasm, loss of articular motion and restricted motion of soft tissue  Soft tissue mobilization: popliteal region, hamstring, gastroc to tolerance. Joint mobs: patella to tolerance all planes. MODALITIES: (5 minutes, no charge): *  Cold Pack Therapy in order to provide analgesia, relieve muscle spasm and reduce inflammation and edema. HEP: As above; handouts given to patient for all exercises. Treatment/Session Summary:    Response to Treatment:  Pt. tolerated session well with decreased pain and tightness after session. Communication/Consultation:  None today  Equipment provided today:  None today  Recommendations/Intent for next treatment session: Next visit will focus on pain control, improve right knee mobility, improve RLE strength, progress to gait/balance training.     Total Treatment Billable Duration:  53 minutes:   PT Patient Time In/Time Out  Time In: 1105  Time Out: [facility]

## 2022-04-26 ENCOUNTER — HOSPITAL ENCOUNTER (OUTPATIENT)
Dept: PHYSICAL THERAPY | Age: 52
Discharge: HOME | End: 2022-04-26
Attending: SPECIALIST/TECHNOLOGIST
Payer: COMMERCIAL

## 2022-04-26 PROCEDURE — 97110 THERAPEUTIC EXERCISES: CPT

## 2022-04-26 PROCEDURE — 97140 MANUAL THERAPY 1/> REGIONS: CPT

## 2022-04-26 NOTE — PROGRESS NOTES
Agustina August  : 1970  Primary: Asha Israel Essentials*  Secondary:  [facility] at [facility]  [address], [address], [place], [address] [place] [address]  [phone]   [phone]      OUTPATIENT PHYSICAL THERAPY: Daily Treatment Note 2022    ICD-10: Treatment Diagnosis: Right knee pain [M25.561]                Treatment Diagnosis 2: Other chronic pain [G89.29]                Treatment Diagnosis 3: Difficulty walking, not elsewhere classified [R26.2]  Precautions: DM, h/o fall. Allergies: Benicar [olmesartan], Amlodipine, Lisinopril, and Sulfa (sulfonamide antibiotics)  TREATMENT PLAN:  Effective Dates: 2022 TO 2022. Frequency/Duration: 2 times a week for 8 weeks. MEDICAL/REFERRING DIAGNOSIS:  Chronic pain of right knee [M25.561, G89.29]  DATE OF ONSET: 2021, injured from fall. REFERRING PHYSICIAN: Dominik Jo NP MD Orders: Evaluate and Treat   Return MD Appointment: TBD by patient. Pre-treatment Symptoms/Complaints:  Pt reported minimal knee pain today. Pain: Initial: Pain Intensity 1: 1  Pain Location 1: Knee  Pain Orientation 1: Right  Post Session:  0/10   Medications Last Reviewed:  2022  Updated Objective Findings:  Palpation: Pt. Sensitive to touch with medial posterior right knee. TREATMENT:   THERAPEUTIC EXERCISE: (45 minutes):  Exercises per grid below to improve mobility, strength, balance and coordination. Required moderate visual, verbal, manual and tactile cues to promote proper body alignment, promote proper body posture and promote proper body mechanics. Progressed resistance, range, repetitions and complexity of movement as indicated.      Date:  2022 Date:  22 Date:  22   Activity/Exercise Parameters Parameters Parameters   Nu step  Level 1, 10 mins Level 4 x 10 mins Level 4 x 12 mins    Clam shells 2x10 2x10 reps each LE 2x10 reps each LE   Quad set Towel under ankle, [id number] X 20 reps 5 sec hold X 20 reps 5 sec hold    Heel slide Supine with strap, 2x10 Supine strap 2 x 10 Supine strap 2x10    Gastroc stretch Slantboard, [id number] Strap x 5 Strap 4x30 sec hold    Ankle pumps Seated, x20 2 x 20 X 40 reps supine   Straight leg raise 2 x10 reps  2 x 10 3x10 reps   Calf raises  2 x 10 2x10    Weight shifts 10x5 sec 10 x 5 sec X 20 reps    Standing hip flexion  X 20 reps  2x10 at bar    Standing hamstring curls   X 20 reps BLE's at bar X 20 reps BLE at bar   Standing hip abduction  2x10 reos BLE's  2x10 reps BLE's at bar                  Time spent with patient reviewing proper muscle recruitment and technique with exercises. MANUAL THERAPY: (10 minutes): Joint mobilization, Soft tissue mobilization and PROM, manual stretching was utilized and necessary because of the patient's restricted joint motion, painful spasm, loss of articular motion and restricted motion of soft tissue  Soft tissue mobilization: popliteal region, hamstring, gastroc to tolerance. Joint mobs: patella to tolerance all planes. MODALITIES: (5 minutes, no charge): *  Cold Pack Therapy in order to provide analgesia, relieve muscle spasm and reduce inflammation and edema. HEP: As above; handouts given to patient for all exercises. Treatment/Session Summary:    Response to Treatment:  Pt. was complaint with all exercises and reported feeling better after session. Communication/Consultation:  None today  Equipment provided today:  None today  Recommendations/Intent for next treatment session: Next visit will focus on pain control, improve right knee mobility, improve RLE strength, progress to gait/balance training.     Total Treatment Billable Duration:  55 minutes:   PT Patient Time In/Time Out  Time In: 1000  Time Out: [facility]

## 2022-04-28 ENCOUNTER — HOSPITAL ENCOUNTER (OUTPATIENT)
Dept: PHYSICAL THERAPY | Age: 52
Discharge: HOME | End: 2022-04-28
Attending: SPECIALIST/TECHNOLOGIST
Payer: COMMERCIAL

## 2022-04-28 PROCEDURE — 97140 MANUAL THERAPY 1/> REGIONS: CPT

## 2022-04-28 PROCEDURE — 97110 THERAPEUTIC EXERCISES: CPT

## 2022-04-28 NOTE — PROGRESS NOTES
Denna Peabody  : 1970  Primary: Troy Palomo Essentials*  Secondary:  [facility] at [facility]  [address], [address], [place], [address] [place] [address]  [phone]   [phone]      OUTPATIENT PHYSICAL THERAPY: Daily Treatment Note 2022    ICD-10: Treatment Diagnosis: Right knee pain [M25.561]                Treatment Diagnosis 2: Other chronic pain [G89.29]                Treatment Diagnosis 3: Difficulty walking, not elsewhere classified [R26.2]  Precautions: DM, h/o fall. Allergies: Benicar [olmesartan], Amlodipine, Lisinopril, and Sulfa (sulfonamide antibiotics)  TREATMENT PLAN:  Effective Dates: 2022 TO 2022. Frequency/Duration: 2 times a week for 8 weeks. MEDICAL/REFERRING DIAGNOSIS:  Chronic pain of right knee [M25.561, G89.29]  DATE OF ONSET: 2021, injured from fall. REFERRING PHYSICIAN: Marietta Hamlin NP MD Orders: Evaluate and Treat   Return MD Appointment: TBD by patient. Pre-treatment Symptoms/Complaints:  Pt reported she can now pivot without discomfort. Pain: Initial: Pain Intensity 1: 2  Pain Location 1: Knee  Pain Orientation 1: Right  Post Session:  0/10    Medications Last Reviewed:  2022  Updated Objective Findings:  Palpation: Pt. Continues to be sensitive to touch in right LE popliteal region with manual.     TREATMENT:   THERAPEUTIC EXERCISE: (45 minutes):  Exercises per grid below to improve mobility, strength, balance and coordination. Required moderate visual, verbal, manual and tactile cues to promote proper body alignment, promote proper body posture and promote proper body mechanics. Progressed resistance, range, repetitions and complexity of movement as indicated.      Date:  2022 Date:  22 Date:  22   Activity/Exercise Parameters Parameters Parameters   Nu step  Level 4 x  10 mins Level 4 x 10 mins Level 4 x 12 mins    Clam shells 2x10 2x10 reps each LE 2x10 reps each LE   Quad set Towel under ankle, [id number] X 20 reps 5 sec hold  X 20 reps 5 sec hold    Heel slide Supine with strap, 2x10 Supine strap 2 x 10 Supine strap 2x10    Gastroc stretch Slantboard, [id number] Strap x 5 Strap 4x30 sec hold    Ankle pumps Seated, x20 2 x 20 X 40 reps supine   Straight leg raise 2 x10 reps  2 x 10 3x10 reps   Calf raises X 20 reps at bar  2 x 10 2x10    Weight shifts X 20 reps fwd side and bkwd 10 x 5 sec X 20 reps    Standing hip flexion X 20 reps BLE's at bar X 20 reps  2x10 at bar    Standing hamstring curls  X 20 reps  X 20 reps BLE's at bar X 20 reps BLE at bar   Standing hip abduction 2x10 reps BLE's  2x10 reps BLE's  2x10 reps BLE's at bar    I T band stretch 4x30 sec hold with strap. Time spent with patient reviewing proper muscle recruitment and technique with exercises. MANUAL THERAPY: (10 minutes): Joint mobilization, Soft tissue mobilization and PROM, manual stretching was utilized and necessary because of the patient's restricted joint motion, painful spasm, loss of articular motion and restricted motion of soft tissue  Soft tissue mobilization: popliteal region, hamstring, gastroc to tolerance in prone with and without suction cup. Joint mobs: patella to tolerance all planes. MODALITIES: (5 minutes, no charge): *  Cold Pack Therapy in order to provide analgesia, relieve muscle spasm and reduce inflammation and edema. HEP: As above; handouts given to patient for all exercises. Treatment/Session Summary:    Response to Treatment:  Pt. was complaint with all exercises and reported feeling better after session. Communication/Consultation:  None today  Equipment provided today:  None today  Recommendations/Intent for next treatment session: Next visit will focus on pain control, improve right knee mobility, improve RLE strength, progress to gait/balance training.     Total Treatment Billable Duration:  55 minutes:   PT Patient Time In/Time Out  Time In: 1100  Time Out: [address] [place], [place]

## 2022-05-04 ENCOUNTER — HOSPITAL ENCOUNTER (OUTPATIENT)
Dept: PHYSICAL THERAPY | Age: 52
Discharge: HOME | End: 2022-05-04
Attending: SPECIALIST/TECHNOLOGIST
Payer: COMMERCIAL

## 2022-05-04 PROCEDURE — 97140 MANUAL THERAPY 1/> REGIONS: CPT

## 2022-05-04 PROCEDURE — 97110 THERAPEUTIC EXERCISES: CPT

## 2022-05-04 NOTE — PROGRESS NOTES
Katina Salas  : 1970  Primary: [facility]*  Secondary:  [facility] at [address], [place], [address] [place] [address]  [phone]   [phone]       OUTPATIENT PHYSICAL THERAPY:Progress Report 2022    ICD-10: Treatment Diagnosis: Right knee pain [M25.561]                Treatment Diagnosis 2: Other chronic pain [G89.29]                Treatment Diagnosis 3: Difficulty walking, not elsewhere classified [R26.2]  Precautions: DM, h/o fall. Allergies: Benicar [olmesartan], Amlodipine, Lisinopril, and Sulfa (sulfonamide antibiotics)  TREATMENT PLAN:  Effective Dates: 2022 TO 2022. Frequency/Duration: 2 times a week for 8 weeks. MEDICAL/REFERRING DIAGNOSIS:  Pain in right knee [M25.561]  Other chronic pain [G89.29]  DATE OF ONSET: 2021, injured from fall. REFERRING PHYSICIAN: Sushila Hernandez NP MD Orders: Evaluate and Treat   Return MD Appointment: TBD by patient. INITIAL ASSESSMENT:  Ms. Nidhi Roque is a 47 y.o. female presenting to physical therapy with complaints of right knee pain chronic in nature with original onset of symptoms from fall while out walking her dog 2021. Per pt fell in man hole causing injury which patient reports originally was worse in left knee however over the course of last year progressed to right knee pain being primary complaint. Pt reports difficulty with walking, driving and prolonged sitting at this time. Pt reports intermittent swelling in right knee with activity. Pt has seen orthopedic MD who performed x-ray which showed mild right knee arthritic changes. MRI per MD report indicating mild MCL sprain. Pt referred by MD for PT eval and treat. Pt will benefit from skilled PT treatment to address deficits in strength, AROM, balance, gait and functional mobility in order to return to prior level of function and improve quality of life.     PROGRESS NOTE 2022: Pt has completed 8 PT treatment sessions from 4/6/2022 to 5/4/2022. Since starting PT treatment pt reports significant improvement in overall pain levels and demonstrates significant improvements in right knee ROM and RLE strength at this time. Limitations remain including difficulty with community distance ambulation, prolonged standing and driving activities. Pt will benefit from skilled PT treatment to address deficits in strength, AROM, balance, gait and functional mobility in order to return to prior level of function and improve quality of life. PROBLEM LIST (Impacting functional limitations):  1. Decreased Strength  2. Decreased ADL/Functional Activities  3. Decreased Transfer Abilities  4. Decreased Ambulation Ability/Technique  5. Decreased Balance  6. Increased Pain  7. Decreased Activity Tolerance  8. Decreased Flexibility/Joint Mobility  9. Edema/Girth  10. Decreased [place] with Home Exercise Program INTERVENTIONS PLANNED: (Treatment may consist of any combination of the following)  1. Balance Exercise  2. Bed Mobility  3. Cold  4. Electrical Stimulation  5. Family Education  6. Gait Training  7. Heat  8. Home Exercise Program (HEP)  9. Iontophoresis  10. Manual Therapy  11. Neuromuscular Re-education/Strengthening  12. Range of Motion (ROM)  13. Therapeutic Activites  14. Therapeutic Exercise/Strengthening  15. Transcutaneous Electrical Nerve Stimulation (TENS)  16. Transfer Training  17. Ultrasound (US)     GOALS: (Goals have been discussed and agreed upon with patient.)  Short-Term Functional Goals: Time Frame: 4/6/2022 to 5/4/2022. 1. Patient demonstrates independence with home exercise program without verbal cueing provided by therapist. - ONGOING  2. Pt will reports worst pain 3/10 or less at rest in order to return to unrestricted prolonged sitting 30 mins or greater. - GOAL MET 5/4/2022  3.  Pt will demonstrate right knee passive extension to 0 degrees or greater in order to progress toward normalized gait pattern. - GOAL MET 5/4/2022  Discharge Goals: Time Frame: 4/6/2022 to 6/1/2022. Pt will report worst pain 3/10 or less with prolonged standing/walking 15 mins or greater in order to return to unrestricted community distance ambulation. - ONGOING  Pt will demonstrate right knee AROM 0-125 degrees or greater in order to return to independent functional transfers. - ONGOING  Pt will demonstrate gross RLE strength to 4/5 or greater all planes in order to improve stair navigation and autombile transfer ability. - ONGOING  LEFS score of 45/80 or greater in order to demonstrate overall functional improvement. - NT 5/4/2022    Outcome Measure: Tool Used: Lower Extremity Functional Scale (LEFS)  Score:  Initial: 34/80 Most Recent: NT/80 (Date: 5/4/2022 )   Interpretation of Score: 20 questions each scored on a 5 point scale with 0 representing \"extreme difficulty or unable to perform\" and 4 representing \"no difficulty\". The lower the score, the greater the functional disability. 80/80 represents no disability. Minimal detectable change is 9 points. Observation/Postural and Gait Assessment: Gait: Mild antalgic pattern RLE, no AD. Palpation: Tenderness to posterior knee, hamstring/gastroc muscles. ROM:     AROM(PROM) Left Right   Knee flexion 130° 130°   Knee extension -2° Lacking 1°(0)     Passive Accessory Mobility Testing: Patella: painful and hypomobile medial and superior glides. Strength:     Manual Muscle Test (out of 5) Left Right   Knee extension 5 4+   Knee flexion 4 4-   Hip abduction 4- 4-     Special Tests:  Unremarkable, poor tolerance to special testing due to pain. Neurological Screen:  Myotomes: Key muscle strength testing for bilateral LE is WNL. Dermatomes: Sensation testing through bilateral LE for light touch is WNL. Reflexes: Patellar (L4) and achilles (S1) are WNL and WNL.      Functional Mobility:  Sit to stand: minimal weight shift observed during sit to stand transition. Step up/down: No pain reported during step down transition. Medical Necessity:   · Patient is expected to demonstrate progress in strength, range of motion, balance, coordination and functional technique to increase independence with community distance ambulation, driving for work, functional transfers and light house/yard work. .  · Skilled intervention continues to be required due to decreased AROM, decreased strength, decreased balance, decreased gait, decreased functional mobility. .  Reason for Services/Other Comments:  · Patient continues to require skilled intervention due to increasing complexity of exercise. .    Rehabilitation Potential For Stated Goals: Good  Regarding Lizbeth Kunz's therapy, I certify that the treatment plan above will be carried out by a therapist or under their direction. Thank you for this referral,  Rylee Orlando PT   DPT  Referring Physician Signature: Dunia Pereira NP No Signature is Required for this note.

## 2022-05-04 NOTE — PROGRESS NOTES
Roxie Adams  : 1970  Primary: Kele[facility]*  Secondary:  [facility] at [facility]  [address] [place], [address], [place], [address] [place] [address]  [phone]   [phone]      OUTPATIENT PHYSICAL THERAPY: Daily Treatment Note 2022    ICD-10: Treatment Diagnosis: Right knee pain [M25.561]                Treatment Diagnosis 2: Other chronic pain [G89.29]                Treatment Diagnosis 3: Difficulty walking, not elsewhere classified [R26.2]  Precautions: DM, h/o fall. Allergies: Benicar [olmesartan], Amlodipine, Lisinopril, and Sulfa (sulfonamide antibiotics)  TREATMENT PLAN:  Effective Dates: 2022 TO 2022. Frequency/Duration: 2 times a week for 8 weeks. MEDICAL/REFERRING DIAGNOSIS:  Chronic pain of right knee [M25.561, G89.29]  DATE OF ONSET: 2021, injured from fall. REFERRING PHYSICIAN: Dunia Pereira NP MD Orders: Evaluate and Treat   Return MD Appointment: TBD by patient. Pre-treatment Symptoms/Complaints:  Pt reported only complaint at this time is mild to moderate pain with pivoting; per pt pain is intermittent. Pain: Initial: Pain Intensity 1: 2  Pain Location 1: Knee  Pain Orientation 1: Right  Post Session:  0/10    Medications Last Reviewed:  2022  Updated Objective Findings:  See progress note dated 2022. TREATMENT:   THERAPEUTIC EXERCISE: (45 minutes):  Exercises per grid below to improve mobility, strength, balance and coordination. Required moderate visual, verbal, manual and tactile cues to promote proper body alignment, promote proper body posture and promote proper body mechanics. Progressed resistance, range, repetitions and complexity of movement as indicated.      Date:  2022 Date:  22 Date:  22   Activity/Exercise Parameters Parameters Parameters   Nu step  Level 4 x  10 mins Level 4 x 10 mins Level 4 x 12 mins    Clam shells 2x10 2x10 reps each LE 2x10 reps each LE   Quad set Towel under ankle, [id number] X 20 reps 5 sec hold  X 20 reps 5 sec hold    Heel slide Supine with strap, 2x10 Supine strap 2 x 10 Supine strap 2x10    Gastroc stretch Slantboard, [id number] Strap x 5 Strap 4x30 sec hold    Ankle pumps Seated, x20 2 x 20 X 40 reps supine   Straight leg raise 2 x10 reps  2 x 10 3x10 reps   Calf raises X 20 reps at bar  2 x 10 2x10    Weight shifts X 20 reps fwd side and bkwd 10 x 5 sec X 20 reps    Standing hip flexion X 20 reps BLE's at bar X 20 reps  2x10 at bar    Standing hamstring curls  X 20 reps  X 20 reps BLE's at bar X 20 reps BLE at bar   Standing hip abduction 2x10 reps BLE's  2x10 reps BLE's  2x10 reps BLE's at bar    I T band stretch 4x30 sec hold with strap. 3x 30sec            Time spent with patient reviewing proper muscle recruitment and technique with exercises. MANUAL THERAPY: (10 minutes): Joint mobilization, Soft tissue mobilization and PROM, manual stretching was utilized and necessary because of the patient's restricted joint motion, painful spasm, loss of articular motion and restricted motion of soft tissue  Soft tissue mobilization: popliteal region, hamstring, gastroc to tolerance in prone with and without suction cup. Joint mobs: patella to tolerance all planes. MODALITIES: (0 minutes, no charge): *  Cold Pack Therapy in order to provide analgesia, relieve muscle spasm and reduce inflammation and edema. - None today    HEP: As above; handouts given to patient for all exercises. Treatment/Session Summary:    Response to Treatment:  Good tolerance to exercise program observed today. .  Communication/Consultation:  None today  Equipment provided today:  None today  Recommendations/Intent for next treatment session: Next visit will focus on pain control, improve right knee mobility, improve RLE strength, progress to gait/balance training.     Total Treatment Billable Duration:  55 minutes:   PT Patient Time In/Time Out  Time In: 1232  Time Out: [address] Tigre Nguyen, PT

## 2022-05-06 ENCOUNTER — HOSPITAL ENCOUNTER (OUTPATIENT)
Dept: PHYSICAL THERAPY | Age: 52
Discharge: HOME | End: 2022-05-06
Attending: SPECIALIST/TECHNOLOGIST
Payer: COMMERCIAL

## 2022-05-06 PROCEDURE — 97140 MANUAL THERAPY 1/> REGIONS: CPT

## 2022-05-06 PROCEDURE — 97110 THERAPEUTIC EXERCISES: CPT

## 2022-05-06 NOTE — PROGRESS NOTES
Niki Post  : 1970  Primary: April [facility]*  Secondary:  [facility]rapy [facility] at [facility]  [address] [place], [address] [place] [address], [facility], [address] [place] [address] Street  [phone]   [phone]      OUTPATIENT PHYSICAL THERAPY: Daily Treatment Note 2022    ICD-10: Treatment Diagnosis: Right knee pain [M25.561]                Treatment Diagnosis 2: Other chronic pain [G89.29]                Treatment Diagnosis 3: Difficulty walking, not elsewhere classified [R26.2]  Precautions: DM, h/o fall. Allergies: Benicar [olmesartan], Amlodipine, Lisinopril, and Sulfa (sulfonamide antibiotics)  TREATMENT PLAN:  Effective Dates: 2022 TO 2022. Frequency/Duration: 2 times a week for 8 weeks. MEDICAL/REFERRING DIAGNOSIS:  Chronic pain of right knee [M25.561, G89.29]  DATE OF ONSET: 2021, injured from fall. REFERRING PHYSICIAN: Stefanie Mckeon NP MD Orders: Evaluate and Treat   Return MD Appointment: TBD by patient. Pre-treatment Symptoms/Complaints:  Pt reports minimal pain today despite poor weather outdoors. Pain: Initial: Pain Intensity 1: 1  Pain Location 1: Knee  Pain Orientation 1: Right  Post Session:  0/10    Medications Last Reviewed:  2022  Updated Objective Findings:  Gait: mild antalgic gait pattern, decreased knee extension RLE at heel strike. TREATMENT:   THERAPEUTIC EXERCISE: (43 minutes):  Exercises per grid below to improve mobility, strength, balance and coordination. Required moderate visual, verbal, manual and tactile cues to promote proper body alignment, promote proper body posture and promote proper body mechanics. Progressed resistance, range, repetitions and complexity of movement as indicated.      Date:  2022 Date:  22 Date:  22   Activity/Exercise Parameters Parameters Parameters   Nu step  Level 4 x  10 mins Level 4 x 10 mins Level 4 x 10 mins    Clam shells 2x10 2x10 reps each LE 2x10 reps each LE   Quad set Towel under ankle, 09p4als X 20 reps 5 sec hold  X 20 reps 5 sec hold    Heel slide Supine with strap, 2x10 Supine strap 2 x 10 Supine strap 2x10    Gastroc stretch Slantboard, [id number] Strap x 5 Strap 4x30 sec hold    Ankle pumps Seated, x20 2 x 20 X 40 reps supine   Straight leg raise 2 x10 reps  2 x 10 3x10 reps   Calf raises X 20 reps at bar  2 x 10 2x10    Weight shifts X 20 reps fwd side and bkwd 10 x 5 sec X 20 reps    Standing hip flexion X 20 reps BLE's at bar X 20 reps  2x10 at bar    Standing hamstring curls  X 20 reps  X 20 reps BLE's at bar X 20 reps BLE at bar   Standing hip abduction 2x10 reps BLE's  2x10 reps BLE's  2x10 reps BLE's at bar    I T band stretch 4x30 sec hold with strap. 3x 30sec [id number]           Time spent with patient reviewing proper muscle recruitment and technique with exercises. MANUAL THERAPY: (10 minutes): Joint mobilization, Soft tissue mobilization and PROM, manual stretching was utilized and necessary because of the patient's restricted joint motion, painful spasm, loss of articular motion and restricted motion of soft tissue  Soft tissue mobilization: popliteal region, hamstring, gastroc to tolerance in prone with and without suction cup. Joint mobs: patella to tolerance all planes. MODALITIES: (0 minutes, no charge): *  Cold Pack Therapy in order to provide analgesia, relieve muscle spasm and reduce inflammation and edema. Ice bag wrapped to knee which patient took home with her with instruction to remove ice in the next 20 mins. HEP: As above; handouts given to patient for all exercises. Treatment/Session Summary:    Response to Treatment:  Verbal cues to correct gait mechanics between exercises. .  Communication/Consultation:  None today  Equipment provided today:  None today  Recommendations/Intent for next treatment session: Next visit will focus on pain control, improve right knee mobility, improve RLE strength, progress to gait/balance training.     Total Treatment Billable Duration:  53 minutes:   PT Patient Time In/Time Out  Time In: 1235  Time Out: FROY/ Farhad 23, PT

## 2022-05-09 ENCOUNTER — HOSPITAL ENCOUNTER (OUTPATIENT)
Dept: PHYSICAL THERAPY | Age: 52
Discharge: HOME | End: 2022-05-09
Attending: SPECIALIST/TECHNOLOGIST
Payer: COMMERCIAL

## 2022-05-09 PROCEDURE — 97110 THERAPEUTIC EXERCISES: CPT

## 2022-05-09 NOTE — PROGRESS NOTES
Agustina August  : 1970  Primary: Asha [facility]*  Secondary:  [facility] at [place] [address], [address] [place] [address], [place], [address] [place] [address]  [phone]   [phone]      OUTPATIENT PHYSICAL THERAPY: Daily Treatment Note 2022    ICD-10: Treatment Diagnosis: Right knee pain [M25.561]                Treatment Diagnosis 2: Other chronic pain [G89.29]                Treatment Diagnosis 3: Difficulty walking, not elsewhere classified [R26.2]  Precautions: DM, h/o fall. Allergies: Benicar [olmesartan], Amlodipine, Lisinopril, and Sulfa (sulfonamide antibiotics)  TREATMENT PLAN:  Effective Dates: 2022 TO 2022. Frequency/Duration: 2 times a week for 8 weeks. MEDICAL/REFERRING DIAGNOSIS:  Chronic pain of right knee [M25.561, G89.29]  DATE OF ONSET: 2021, injured from fall. REFERRING PHYSICIAN: Dominik Jo NP MD Orders: Evaluate and Treat   Return MD Appointment: TBD by patient. Pre-treatment Symptoms/Complaints:  Pt. Arrived 30 mins late to therapy due to getting her schedule confused. Pt. Entered therapy with better gait and no limping due to extensor lag. Pt. Reported really working on it. Pain: Initial: Pain Intensity 1: 0  Pain Location 1: Knee  Pain Orientation 1: Right  Post Session:  0/10    Medications Last Reviewed:  2022  Updated Objective Findings:  Pt. 's right knee range of motion today was 0-125 degrees    TREATMENT:   THERAPEUTIC EXERCISE: (43 minutes):  Exercises per grid below to improve mobility, strength, balance and coordination. Required moderate visual, verbal, manual and tactile cues to promote proper body alignment, promote proper body posture and promote proper body mechanics. Progressed resistance, range, repetitions and complexity of movement as indicated.      Date:  22 Date:  22 Date:  22   Activity/Exercise Parameters Parameters Parameters   Nu step  Level 4 x  10 mins Level 4 x 10 mins Level 4 x 10 mins    Clam shells  2x10 reps BLE's green t-band  2x10 reps each LE 2x10 reps each LE   Quad set ----- X 20 reps 5 sec hold  X 20 reps 5 sec hold    Heel slide ------ Supine strap 2 x 10 Supine strap 2x10    Gastroc stretch Slantboard, [id number] Strap x 5 Strap 4x30 sec hold    Ankle pumps ------- 2 x 20 X 40 reps supine   Straight leg raise ------ 2 x 10 3x10 reps   Calf raises ----- 2 x 10 2x10    Weight shifts --------- 10 x 5 sec X 20 reps    Standing hip flexion X 20 reps BLE's at bar 2.5 lb wt. s  X 20 reps  2x10 at bar    Standing hamstring curls  X 20 reps BLE's 2.5 lb wt. s X 20 reps BLE's at bar X 20 reps BLE at bar   Standing hip abduction 2x10 reps BLE's 2.5 lb wt.s 2x10 reps BLE's  2x10 reps BLE's at bar    I T band stretch -----   3x 30sec [id number]   Long arc  quads 2.5 lb wt. s x 20 reps  Seated      Sit to stand  X 10 reps using bilateral hand rails        Time spent with patient reviewing proper muscle recruitment and technique with exercises. MANUAL THERAPY: (0 minutes): Joint mobilization, Soft tissue mobilization and PROM, manual stretching was utilized and necessary because of the patient's restricted joint motion, painful spasm, loss of articular motion and restricted motion of soft tissue  NONE TODAY  Soft tissue mobilization: popliteal region, hamstring, gastroc to tolerance in prone with and without suction cup. Joint mobs: patella to tolerance all planes. MODALITIES: (0 minutes none today):      none today. .    HEP: As above; handouts given to patient for all exercises. Treatment/Session Summary:    Response to Treatment:  After reviewing patient schedule with her numerous times she appeared to understand .   Communication/Consultation:  None today  Equipment provided today:  None today  Recommendations/Intent for next treatment session: Next visit will focus on pain control, improve right knee mobility, improve RLE strength, progress to gait/balance training.     Total Treatment Billable Duration:  30 minutes:   PT Patient Time In/Time Out  Time In: 1130  Time Out: 3[address] [facility]

## 2022-05-10 ENCOUNTER — APPOINTMENT (OUTPATIENT)
Dept: PHYSICAL THERAPY | Age: 52
End: 2022-05-10
Attending: SPECIALIST/TECHNOLOGIST
Payer: COMMERCIAL

## 2022-05-11 ENCOUNTER — HOSPITAL ENCOUNTER (OUTPATIENT)
Dept: PHYSICAL THERAPY | Age: 52
Discharge: HOME | End: 2022-05-11
Attending: SPECIALIST/TECHNOLOGIST
Payer: COMMERCIAL

## 2022-05-11 PROCEDURE — 97140 MANUAL THERAPY 1/> REGIONS: CPT

## 2022-05-11 PROCEDURE — 97110 THERAPEUTIC EXERCISES: CPT

## 2022-05-11 NOTE — PROGRESS NOTES
Jacey Sullivan  : 1970  Primary: [facility] [employer]*  Secondary:  [facility] at [place] [address], [address] [place] [address], [place], [address] [place] [address]  [phone]   [phone]      OUTPATIENT PHYSICAL THERAPY: Daily Treatment Note 2022    ICD-10: Treatment Diagnosis: Right knee pain [M25.561]                Treatment Diagnosis 2: Other chronic pain [G89.29]                Treatment Diagnosis 3: Difficulty walking, not elsewhere classified [R26.2]  Precautions: DM, h/o fall. Allergies: Benicar [olmesartan], Amlodipine, Lisinopril, and Sulfa (sulfonamide antibiotics)  TREATMENT PLAN:  Effective Dates: 2022 TO 2022. Frequency/Duration: 2 times a week for 8 weeks. MEDICAL/REFERRING DIAGNOSIS:  Chronic pain of right knee [M25.561, G89.29]  DATE OF ONSET: 2021, injured from fall. REFERRING PHYSICIAN: Boni Epley, NP MD Orders: Evaluate and Treat   Return MD Appointment: TBD by patient. Pre-treatment Symptoms/Complaints:  Pt reports knee pain continues to to improve; per pt still some difficulty with straightening right knee. Pain: Initial: Pain Intensity 1: 1  Pain Location 1: Knee  Pain Orientation 1: Right  Post Session:  0/10    Medications Last Reviewed:  2022  Updated Objective Findings:  Observation: improve antalgic gait pattern observed today. TREATMENT:   THERAPEUTIC EXERCISE: (45 minutes):  Exercises per grid below to improve mobility, strength, balance and coordination. Required moderate visual, verbal, manual and tactile cues to promote proper body alignment, promote proper body posture and promote proper body mechanics. Progressed resistance, range, repetitions and complexity of movement as indicated.      Date:  22 Date:  22 Date:  22   Activity/Exercise Parameters Parameters Parameters   Nu step  Level 4 x  10 mins Level 4 x 10 mins Level 4 x 10 mins    Clam shells  2x10 reps BLE's green t-band  2x10 reps each LE 2x10 reps each LE   Quad set ----- X 20 reps 5 sec hold  X 20 reps 5 sec hold    Heel slide ------ Supine strap 2 x 10 Supine strap 2x10    Gastroc stretch Slantboard, [id number] Slantboard, [id number] Strap 4x30 sec hold    Ankle pumps ------- 2 x 20 X 40 reps supine   Straight leg raise ------ 3 x 10 3x10 reps   Calf raises ----- -- 2x10    Weight shifts --------- -- X 20 reps    Standing hip flexion X 20 reps BLE's at bar 2.5 lb wt. s  X 20 reps  2x10 at bar    Standing hamstring curls  X 20 reps BLE's 2.5 lb wt. s X 20 reps BLE's at bar X 20 reps BLE at bar   Standing hip abduction 2x10 reps BLE's 2.5 lb wt.s 2x10 reps BLE's  2x10 reps BLE's at bar    I T band stretch -----   3x 30sec [id number]   Long arc  quads 2.5 lb wt. s x 20 reps  Seated  --    Sit to stand  X 10 reps using bilateral hand rails  x10 from table    Lunge  Forward, partial lunge, x10 each       Time spent with patient reviewing proper muscle recruitment and technique with exercises. MANUAL THERAPY: (10 minutes): Joint mobilization, Soft tissue mobilization and PROM, manual stretching was utilized and necessary because of the patient's restricted joint motion, painful spasm, loss of articular motion and restricted motion of soft tissue   Soft tissue mobilization: popliteal region, hamstring, gastroc to tolerance in prone with and without suction cup. Joint mobs: patella to tolerance all planes. MODALITIES: (0 minutes none today):      none today. HEP: As above; handouts given to patient for all exercises. Treatment/Session Summary:    Response to Treatment:  Pt demonstrates improve overall gait mechanics and tolerance to exercise program today. No pain reported at end of treatment.   Communication/Consultation:  None today  Equipment provided today:  None today  Recommendations/Intent for next treatment session: Next visit will focus on pain control, improve right knee mobility, improve RLE strength, progress to gait/balance training.     Total Treatment Billable Duration:  55 minutes:   PT Patient Time In/Time Out  Time In: 1231  Time Out: 250 Jorge L Flynn, PT

## 2022-05-16 ENCOUNTER — HOSPITAL ENCOUNTER (OUTPATIENT)
Dept: PHYSICAL THERAPY | Age: 52
Discharge: HOME | End: 2022-05-16
Attending: SPECIALIST/TECHNOLOGIST
Payer: COMMERCIAL

## 2022-05-16 PROCEDURE — 97110 THERAPEUTIC EXERCISES: CPT

## 2022-05-16 PROCEDURE — 97140 MANUAL THERAPY 1/> REGIONS: CPT

## 2022-05-16 NOTE — PROGRESS NOTES
Renée Running  : 1970  Primary: [facility]*  Secondary:  [facility]rapy Center at [facility]  [address], [address] [place] [address] Avenue, Foster christie, [address] [place] [address]  [phone]   [phone]      OUTPATIENT PHYSICAL THERAPY: Daily Treatment Note 2022    ICD-10: Treatment Diagnosis: Right knee pain [M25.561]                Treatment Diagnosis 2: Other chronic pain [G89.29]                Treatment Diagnosis 3: Difficulty walking, not elsewhere classified [R26.2]  Precautions: DM, h/o fall. Allergies: Benicar [olmesartan], Amlodipine, Lisinopril, and Sulfa (sulfonamide antibiotics)  TREATMENT PLAN:  Effective Dates: 2022 TO 2022. Frequency/Duration: 2 times a week for 8 weeks. MEDICAL/REFERRING DIAGNOSIS:  Chronic pain of right knee [M25.561, G89.29]  DATE OF ONSET: 2021, injured from fall. REFERRING PHYSICIAN: Radha Valentin NP MD Orders: Evaluate and Treat   Return MD Appointment: TBD by patient. Pre-treatment Symptoms/Complaints:  Pt reports worked this weekend and reports minimal increased right knee pain. Pain: Initial: Pain Intensity 1: 1  Pain Location 1: Knee  Pain Orientation 1: Right  Post Session:  0/10    Medications Last Reviewed:  2022  Updated Objective Findings:  Palpation: tenderness/tightness popliteal region. TREATMENT:   THERAPEUTIC EXERCISE: (43 minutes):  Exercises per grid below to improve mobility, strength, balance and coordination. Required moderate visual, verbal, manual and tactile cues to promote proper body alignment, promote proper body posture and promote proper body mechanics. Progressed resistance, range, repetitions and complexity of movement as indicated.      Date:  22 Date:  22 Date:  22   Activity/Exercise Parameters Parameters Parameters   Nu step  Level 4 x  10 mins Level 4 x 10 mins Level 4 x 10 mins    Clam shells  2x10 reps BLE's green t-band  2x10 reps each LE 2x10 reps each LE   Quad set ----- X 20 reps 5 sec hold  X 20 reps 5 sec hold    Heel slide ------ Supine strap 2 x 10 Supine strap 2x10    Gastroc stretch Slantboard, [id number] Slantboard, [id number] Strap 4x30 sec hold    Ankle pumps ------- 2 x 20 X 40 reps supine   Straight leg raise ------ 3 x 10 3x10 reps   Calf raises ----- -- 2x10    Weight shifts --------- -- --   Standing hip flexion X 20 reps BLE's at bar 2.5 lb wt. s  X 20 reps  2x10 at bar    Standing hamstring curls  X 20 reps BLE's 2.5 lb wt. s X 20 reps BLE's at bar X 20 reps BLE at bar   Standing hip abduction 2x10 reps BLE's 2.5 lb wt.s 2x10 reps BLE's  2x10 reps BLE's at bar    I T band stretch -----   3x 30sec [id number]   Long arc  quads 2.5 lb wt. s x 20 reps  Seated  -- --   Sit to stand  X 10 reps using bilateral hand rails  x10 from table 2x10 from table   Lunge  Forward, partial lunge, x10 each Forward, partial lunge, x10 each   Step up   4 in x10 each            Time spent with patient reviewing proper muscle recruitment and technique with exercises. MANUAL THERAPY: (10 minutes): Joint mobilization, Soft tissue mobilization and PROM, manual stretching was utilized and necessary because of the patient's restricted joint motion, painful spasm, loss of articular motion and restricted motion of soft tissue   Soft tissue mobilization: popliteal region, hamstring, gastroc to tolerance in prone with and without suction cup. Joint mobs: patella to tolerance all planes. MODALITIES: (0 minutes none today):      none today. HEP: As above; handouts given to patient for all exercises. Treatment/Session Summary:    Response to Treatment:  Pt demonstrates improve overall gait mechanics and tolerance to exercise program today.  .  Communication/Consultation:  None today  Equipment provided today:  None today  Recommendations/Intent for next treatment session: Next visit will focus on pain control, improve right knee mobility, improve RLE strength, progress to gait/balance training.     Total Treatment Billable Duration:  53 minutes:   PT Patient Time In/Time Out  Time In: 1238  Time Out: FROY/ Farhad 23, PT

## 2022-05-18 ENCOUNTER — HOSPITAL ENCOUNTER (OUTPATIENT)
Dept: PHYSICAL THERAPY | Age: 52
Discharge: HOME | End: 2022-05-18
Attending: SPECIALIST/TECHNOLOGIST
Payer: COMMERCIAL

## 2022-05-18 PROCEDURE — 97140 MANUAL THERAPY 1/> REGIONS: CPT

## 2022-05-18 PROCEDURE — 97110 THERAPEUTIC EXERCISES: CPT

## 2022-05-18 NOTE — PROGRESS NOTES
[facility]  : 1970  Primary: Julio Bills [employer]entials*  Secondary:  Therapy Center at [facility]  [address], [address] [facility] [place], [address] [place] [address]  [phone]   [phone]      OUTPATIENT PHYSICAL THERAPY: Daily Treatment Note 2022    ICD-10: Treatment Diagnosis: Right knee pain [M25.561]                Treatment Diagnosis 2: Other chronic pain [G89.29]                Treatment Diagnosis 3: Difficulty walking, not elsewhere classified [R26.2]  Precautions: DM, h/o fall. Allergies: Benicar [olmesartan], Amlodipine, Lisinopril, and Sulfa (sulfonamide antibiotics)  TREATMENT PLAN:  Effective Dates: 2022 TO 2022. Frequency/Duration: 2 times a week for 8 weeks. MEDICAL/REFERRING DIAGNOSIS:  Chronic pain of right knee [M25.561, G89.29]  DATE OF ONSET: 2021, injured from fall. REFERRING PHYSICIAN: Magno Marrufo NP MD Orders: Evaluate and Treat   Return MD Appointment: TBD by patient. Pre-treatment Symptoms/Complaints:  Pt reports pain levels have been well controlled at this time; pt reports has been noticing slight \"popping\" anterior right knee with mild pain unsure of cause. Pain: Initial: Pain Intensity 1: 1  Pain Location 1: Knee  Pain Orientation 1: Right  Post Session:  0/10    Medications Last Reviewed:  2022  Updated Objective Findings:  Palpation: mild tenderness noted posterio-lateral right knee. TREATMENT:   THERAPEUTIC EXERCISE: (43 minutes):  Exercises per grid below to improve mobility, strength, balance and coordination. Required moderate visual, verbal, manual and tactile cues to promote proper body alignment, promote proper body posture and promote proper body mechanics. Progressed resistance, range, repetitions and complexity of movement as indicated.      Date:  22 Date:  22 Date:  22   Activity/Exercise Parameters Parameters Parameters   Nu step  Level 4 x  10 mins Level 4 x 10 mins Level 4 x 10 mins    Clam shells  2x10 reps BLE's green t-band  2x10 reps each LE 2x10 reps each LE   Quad set ----- X 20 reps 5 sec hold  X 20 reps 5 sec hold    Heel slide ------ Supine strap 2 x 10 Supine strap 2x10    Gastroc stretch Slantboard, [id number] Slantboard, [id number] Strap 4x30 sec hold    Ankle pumps x30 2 x 20 X 40 reps supine   Straight leg raise 3x10 3 x 10 3x10 reps   Calf raises 3x10 -- 2x10    Weight shifts --------- -- --   Standing hip flexion X 20 reps BLE's at bar 2.5 lb wt. s  X 20 reps  2x10 at bar    Standing hamstring curls  X 20 reps BLE's 2.5 lb wt. s X 20 reps BLE's at bar X 20 reps BLE at bar   Standing hip abduction 2x10 reps BLE's 2.5 lb wt.s 2x10 reps BLE's  2x10 reps BLE's at bar    I T band stretch -----   3x 30sec [id number]   Long arc  quads Hold -- --   Sit to stand  X 10 reps using bilateral hand rails  x10 from table 2x10 from table   Lunge Forward, partial lunge, x10 each Forward, partial lunge, x10 each Forward, partial lunge, x10 each   Step up 6 in x10 fwd  4 in x10 each            Time spent with patient reviewing proper muscle recruitment and technique with exercises. MANUAL THERAPY: (10 minutes): Joint mobilization, Soft tissue mobilization and PROM, manual stretching was utilized and necessary because of the patient's restricted joint motion, painful spasm, loss of articular motion and restricted motion of soft tissue   Soft tissue mobilization: popliteal region, hamstring, gastroc to tolerance in prone with and without suction cup. Joint mobs: patella to tolerance all planes. MODALITIES: (0 minutes, none today):      none today. HEP: As above; handouts given to patient for all exercises. Treatment/Session Summary:    Response to Treatment:  Good tolerance to exercise program; no complaints of knee popping reported during treatment. .  Communication/Consultation:  None today  Equipment provided today:  None today  Recommendations/Intent for next treatment session: Next visit will focus on pain control, improve right knee mobility, improve RLE strength, progress to gait/balance training.     Total Treatment Billable Duration:  53 minutes:   PT Patient Time In/Time Out  Time In: 1231  Time Out: 1316 Caitlyn Calhoun, PT

## 2022-05-25 ENCOUNTER — HOSPITAL ENCOUNTER (OUTPATIENT)
Dept: PHYSICAL THERAPY | Age: 52
Setting detail: RECURRING SERIES
Discharge: HOME | End: 2022-05-28
Payer: COMMERCIAL

## 2022-05-25 ENCOUNTER — APPOINTMENT (OUTPATIENT)
Dept: PHYSICAL THERAPY | Age: 52
End: 2022-05-25
Attending: SPECIALIST/TECHNOLOGIST
Payer: COMMERCIAL

## 2022-05-25 PROCEDURE — 97110 THERAPEUTIC EXERCISES: CPT

## 2022-05-25 ASSESSMENT — PAIN DESCRIPTION - LOCATION: LOCATION: KNEE

## 2022-05-25 ASSESSMENT — PAIN DESCRIPTION - DESCRIPTORS: DESCRIPTORS: SORE

## 2022-05-25 ASSESSMENT — PAIN SCALES - GENERAL: PAINLEVEL_OUTOF10: 1

## 2022-05-25 ASSESSMENT — PAIN DESCRIPTION - ORIENTATION: ORIENTATION: RIGHT

## 2022-05-25 NOTE — PROGRESS NOTES
Blake West  : 1970  Primary:   Secondary:  [address] @ [address] [id number]  Phone: [phone]  Fax: [phone] Plan Frequency: 2 times per week for 8 weeks    Plan of Care/Certification Expiration Date: 22 (Start of POC 2022)      PT Visit Info:    No data recorded    OUTPATIENT PHYSICAL THERAPY:OP NOTE TYPE: Treatment Note 2022       Episode   Appt Desk       Treatment Diagnosis:  ICD-10: Treatment Diagnosis: Right knee pain [M25.561]                Treatment Diagnosis 2: Other chronic pain [G89.29]                Treatment Diagnosis 3: Difficulty walking, not elsewhere classified [R26.2]  Medical/Referring Diagnosis:  Chronic pain of right knee [J93.901, G89.29]  Referring Physician:  Shravan Newberry MD Orders:  PT Eval and Treat   Date of Onset:  Onset Date: 21 (Original injury from fall 2021)     Allergies:  Olmesartan, Amlodipine, Lisinopril, and Sulfa antibiotics  Restrictions/Precautions:    Restrictions/Precautions: Other (comment) (DM II, h/o fall.)    Interventions Planned (Treatment may consist of any combination of the following):    Balance Exercise  Bed Mobility  Cold  Electrical Stimulation   Family Education  Gait Training  Heat  Home Exercise Program (HEP)  Iontophoresis  Manual Therapy  Neuromuscular Re-education/Strengthening  Range of Motion (ROM)  Therapeutic Activites  Therapeutic Exercise/Strengthening  Transcutaneous Electrical Nerve Stimulation (TENS)  Transfer Training  Ultrasound (US)    Subjective Comments:  Pt reports knee pain has improved significantly over previous weekend  Initial: Right Knee 1/10 Post Session: Right Knee 0/10  Medications Last Reviewed:  2022  Updated Objective Findings:  Gait: Mild antalgic pattern RLE observed; correctable with verbal cues.   Treatment   THERAPEUTIC EXERCISE: (40 minutes):  Exercises per grid below to improve mobility, strength, balance and coordination. Required moderate visual, verbal, manual and tactile cues to promote proper body alignment, promote proper body posture and promote proper body mechanics. Progressed resistance, range, repetitions and complexity of movement as indicated.       Date:  5/18/22 Date:  5/25/22 Date:  5/16/22   Activity/Exercise Parameters Parameters Parameters   Nu step  Level 4 x  10 mins Level 4 x 10 mins Level 4 x 10 mins    Clam shells  2x10 reps BLE's green t-band  2x10 reps each LE 2x10 reps each LE   Quad set ----- X 20 reps 5 sec hold  X 20 reps 5 sec hold    Heel slide ------ Supine strap 2 x 10 Supine strap 2x10    Gastroc stretch Slantboard, [id number] Slantboard, [id number] Strap 4x30 sec hold    Ankle pumps x30 2 x 20 X 40 reps supine   Straight leg raise 3x10 3 x 10 3x10 reps   Calf raises 3x10 -- 2x10    Weight shifts --------- -- --   Standing hip flexion X 20 reps BLE's at bar 2.5 lb wt. s  X 20 reps  2x10 at bar    Standing hamstring curls  X 20 reps BLE's 2.5 lb wt. s X 20 reps BLE's at bar X 20 reps BLE at bar   Standing hip abduction 2x10 reps BLE's 2.5 lb wt.s 2x10 reps BLE's  2x10 reps BLE's at bar    I T band stretch -----    -- [id number]   Long arc  quads Hold -- --   Sit to stand  X 10 reps using bilateral hand rails  x10 from table 2x10 from table   Lunge Forward, partial lunge, x10 each Forward, partial lunge, x10 each Forward, partial lunge, x10 each   Step up 6 in x10 fwd  -- 4 in x10 each   Lateral stepping    30 ft x2     Monster walks  30 ft x2             Time spent with patient reviewing proper muscle recruitment and technique with exercises.     MANUAL THERAPY: (5 minutes, no charge): Joint mobilization, Soft tissue mobilization and PROM, manual stretching was utilized and necessary because of the patient's restricted joint motion, painful spasm, loss of articular motion and restricted motion of soft tissue   Soft tissue mobilization: popliteal region, hamstring, gastroc to tolerance in prone with and without suction cup. Joint mobs: patella to tolerance all planes.     MODALITIES: (0 minutes, none today):      none today.     HEP: As above; handouts given to patient for all exercises. Treatment/Session Summary:    Treatment Assessment:   Pt demonstrates good tolerance to added dynamic strengthening exercises. Good symptom relief reported at end of treatment. Communication/Consultation:  None today  Equipment provided today:  Red and blue TB. Recommendations/Intent for next treatment session: Next visit will focus on pain control, improve right knee mobility, improve RLE strength, progress to gait/balance training.     Total Treatment Billable 10Duration:  40 minutes  Time In: 4966  Time Out: [address] [facility], PT       Post Session Pain  Charge Capture  [employer] Portal  MD Guidelines  Scanned Media  Benefits  MyChart    Future Appointments   Date Time Provider Mary Lou Mcdonald   5/27/2022 12:30 PM Silvana Victoria PT [facility]   5/31/2022 12:30 PM Silvana Victoria PT [facility]   7/7/2022  9:00 AM [facility]   7/11/2022  8:40 AM Jeannine Capellan MD [facility]

## 2022-05-27 ENCOUNTER — APPOINTMENT (OUTPATIENT)
Dept: PHYSICAL THERAPY | Age: 52
End: 2022-05-27
Attending: SPECIALIST/TECHNOLOGIST
Payer: COMMERCIAL

## 2022-05-27 ENCOUNTER — HOSPITAL ENCOUNTER (OUTPATIENT)
Dept: PHYSICAL THERAPY | Age: 52
Setting detail: RECURRING SERIES
Discharge: HOME | End: 2022-05-30
Payer: COMMERCIAL

## 2022-05-27 PROCEDURE — 97110 THERAPEUTIC EXERCISES: CPT

## 2022-05-27 PROCEDURE — 97140 MANUAL THERAPY 1/> REGIONS: CPT

## 2022-05-27 ASSESSMENT — PAIN DESCRIPTION - LOCATION: LOCATION: KNEE

## 2022-05-27 ASSESSMENT — PAIN SCALES - GENERAL: PAINLEVEL_OUTOF10: 1

## 2022-05-27 ASSESSMENT — PAIN DESCRIPTION - ORIENTATION: ORIENTATION: RIGHT

## 2022-05-27 ASSESSMENT — PAIN DESCRIPTION - DESCRIPTORS: DESCRIPTORS: TIGHTNESS

## 2022-05-31 ENCOUNTER — HOSPITAL ENCOUNTER (OUTPATIENT)
Dept: PHYSICAL THERAPY | Age: 52
Setting detail: RECURRING SERIES
Discharge: HOME | End: 2022-06-03
Payer: COMMERCIAL

## 2022-05-31 ENCOUNTER — APPOINTMENT (OUTPATIENT)
Dept: PHYSICAL THERAPY | Age: 52
End: 2022-05-31
Attending: SPECIALIST/TECHNOLOGIST
Payer: COMMERCIAL

## 2022-05-31 PROCEDURE — 97110 THERAPEUTIC EXERCISES: CPT

## 2022-05-31 PROCEDURE — 97140 MANUAL THERAPY 1/> REGIONS: CPT

## 2022-05-31 ASSESSMENT — PAIN DESCRIPTION - LOCATION: LOCATION: KNEE

## 2022-05-31 ASSESSMENT — PAIN SCALES - GENERAL: PAINLEVEL_OUTOF10: 1

## 2022-05-31 ASSESSMENT — PAIN DESCRIPTION - ORIENTATION: ORIENTATION: RIGHT

## 2022-05-31 ASSESSMENT — PAIN DESCRIPTION - DESCRIPTORS: DESCRIPTORS: TIGHTNESS

## 2022-05-31 NOTE — PLAN OF CARE
Meliton Gardner  : 1970  Primary: [facility] Gal[facility] [employer]entials*  Secondary:  Therapy Center at [address], [address]  [phone]   [phone]       OUTPATIENT PHYSICAL THERAPY:Discharge Note 2022    ICD-10: Treatment Diagnosis: Right knee pain [M25.561]                Treatment Diagnosis 2: Other chronic pain [G89.29]                Treatment Diagnosis 3: Difficulty walking, not elsewhere classified [R26.2]  Precautions: DM, h/o fall. Allergies: Benicar [olmesartan], Amlodipine, Lisinopril, and Sulfa (sulfonamide antibiotics)  TREATMENT PLAN:  Effective Dates: 2022 TO 2022. Frequency/Duration: 2 times a week for 8 weeks. MEDICAL/REFERRING DIAGNOSIS:  Pain in right knee [M25.561]  Other chronic pain [G89.29]  DATE OF ONSET: 2021, injured from fall. REFERRING PHYSICIAN: Blayne Paul NP MD Orders: Evaluate and Treat   Return MD Appointment: TBD by patient.      INITIAL ASSESSMENT:  Ms. Barajas is a 47 y.o. female presenting to physical therapy with complaints of right knee pain chronic in nature with original onset of symptoms from fall while out walking her dog 2021. Per pt fell in man hole causing injury which patient reports originally was worse in left knee however over the course of last year progressed to right knee pain being primary complaint. Pt reports difficulty with walking, driving and prolonged sitting at this time. Pt reports intermittent swelling in right knee with activity. Pt has seen orthopedic MD who performed x-ray which showed mild right knee arthritic changes. MRI per MD report indicating mild MCL sprain. Pt referred by MD for PT eval and treat.  Pt will benefit from skilled PT treatment to address deficits in strength, AROM, balance, gait and functional mobility in order to return to prior level of function and improve quality of life.     DISCHARGE NOTE 2022: Pt has completed 16 PT treatment sessions from 4/6/2022 to 5/31/2022. Pt demonstrating all goals complete at this time and patient is independent with HEP. PT recommending discharge from PT treatment at this time, pt to continue independent HEP performance. Pt agrees with plan.         PROBLEM LIST (Impacting functional limitations):  1. Decreased Strength  2. Decreased ADL/Functional Activities  3. Decreased Transfer Abilities  4. Decreased Ambulation Ability/Technique  5. Decreased Balance  6. Increased Pain  7. Decreased Activity Tolerance  8. Decreased Flexibility/Joint Mobility  9. Edema/Girth  10. Decreased [place] with Home Exercise Program INTERVENTIONS PLANNED: (Treatment may consist of any combination of the following)  1. Balance Exercise  2. Bed Mobility  3. Cold  4. Electrical Stimulation  5. Family Education  6. Gait Training  7. Heat  8. Home Exercise Program (HEP)  9. Iontophoresis  10. Manual Therapy  11. Neuromuscular Re-education/Strengthening  12. Range of Motion (ROM)  13. Therapeutic Activites  14. Therapeutic Exercise/Strengthening  15. Transcutaneous Electrical Nerve Stimulation (TENS)  16. Transfer Training  17. Ultrasound (US)      GOALS: (Goals have been discussed and agreed upon with patient.)  Short-Term Functional Goals: Time Frame: 4/6/2022 to 5/4/2022. 1. Patient demonstrates independence with home exercise program without verbal cueing provided by therapist. - GOAL MET 5/31/2022  2. Pt will reports worst pain 3/10 or less at rest in order to return to unrestricted prolonged sitting 30 mins or greater. - GOAL MET 5/4/2022  3. Pt will demonstrate right knee passive extension to 0 degrees or greater in order to progress toward normalized gait pattern. - GOAL MET 5/4/2022  Discharge Goals: Time Frame: 4/6/2022 to 6/1/2022.   1. Pt will report worst pain 3/10 or less with prolonged standing/walking 15 mins or greater in order to return to unrestricted community distance ambulation. - GOAL MET 5/31/2022  2. Pt will demonstrate right knee AROM 0-125 degrees or greater in order to return to independent functional transfers. - GOAL MET 5/31/2022  3. Pt will demonstrate gross RLE strength to 4/5 or greater all planes in order to improve stair navigation and autombile transfer ability. - GOAL MET 5/31/2022  4. LEFS score of 45/80 or greater in order to demonstrate overall functional improvement. -  NOT MET     Outcome Measure: Tool Used: Lower Extremity Functional Scale (LEFS)  Score:  Initial: 34/80 Most Recent: NT/80 (Date: 5/31/2022 )   Interpretation of Score: 20 questions each scored on a 5 point scale with 0 representing \"extreme difficulty or unable to perform\" and 4 representing \"no difficulty\". The lower the score, the greater the functional disability. 80/80 represents no disability. Minimal detectable change is 9 points.     Observation/Postural and Gait Assessment: Gait: No deviations to gait pattern, no AD.     Palpation: Mild tenderness to posterior knee, hamstring/gastroc muscles.     ROM:     AROM(PROM)   Left Right   Knee flexion 130° 130°   Knee extension -2° 0°(-1)      Passive Accessory Mobility Testing: Patella: normal mobility and painless.     Strength:     Manual Muscle Test (out of 5)   Left Right   Knee extension 5 5   Knee flexion 4 4   Hip abduction 4 4      Special Tests:  Unremarkable, poor tolerance to special testing due to pain.     Neurological Screen:  Myotomes: Key muscle strength testing for bilateral LE is WNL. Dermatomes: Sensation testing through bilateral LE for light touch is WNL. Reflexes: Patellar (L4) and achilles (S1) are WNL and WNL.      Functional Mobility:  Sit to stand: Good coordination of trunk with BLEs during sit to stand transition. Step up/down: No pain reported during step down transition.     Regarding Maru Hardy's therapy, I certify that the treatment plan above will be carried out by a therapist or under their direction.   Thank you for this referral,  Josh Garcia, PT   DPT  Referring Physician Signature: Wnidy Pak NP No Signature is Required for this note.

## 2022-05-31 NOTE — PROGRESS NOTES
Ally David  : 1970  Primary:   Secondary:  [address] [facility] [address]  Phone: [phone]  Fax: [phone] Plan Frequency: 2 times per week for 8 weeks    Plan of Care/Certification Expiration Date: 22 (Start of POC 2022)      PT Visit Info:    No data recorded    OUTPATIENT PHYSICAL THERAPY:OP NOTE TYPE: Treatment Note 2022       Episode   Appt Desk       Treatment Diagnosis:  ICD-10: Treatment Diagnosis: Right knee pain [M25.561]                Treatment Diagnosis 2: Other chronic pain [G89.29]                Treatment Diagnosis 3: Difficulty walking, not elsewhere classified [R26.2]  Medical/Referring Diagnosis:  Chronic pain of right knee [Z99.839, G89.29]  Referring Physician:  Puneet Katz MD Orders:  PT Eval and Treat   Date of Onset:  Onset Date: 21 (Original injury from fall 2021)     Allergies:  Olmesartan, Amlodipine, Lisinopril, and Sulfa antibiotics  Restrictions/Precautions:    Restrictions/Precautions: Other (comment) (DM II, h/o fall.)    Interventions Planned (Treatment may consist of any combination of the following):    Balance Exercise  Bed Mobility  Cold  Electrical Stimulation   Family Education  Gait Training  Heat  Home Exercise Program (HEP)  Iontophoresis  Manual Therapy  Neuromuscular Re-education/Strengthening  Range of Motion (ROM)  Therapeutic Activites  Therapeutic Exercise/Strengthening  Transcutaneous Electrical Nerve Stimulation (TENS)  Transfer Training  Ultrasound (US)    Subjective Comments:  Pt reports independence wiht HEP at this time; pt verbalizes confidence to continue independently with HEP at this time. Initial: Right Knee 1/10 Post Session: Right Knee 0/10  Medications Last Reviewed:  2022  Updated Objective Findings:  See discharge note dated 2022.   Treatment   THERAPEUTIC EXERCISE: (43 minutes):  Exercises per grid below to improve mobility, strength, balance and coordination. Required moderate visual, verbal, manual and tactile cues to promote proper body alignment, promote proper body posture and promote proper body mechanics. Progressed resistance, range, repetitions and complexity of movement as indicated.       Date:  5/31/22 Date:  5/25/22 Date:  5/27/22   Activity/Exercise Parameters Parameters Parameters   Nu step  Level 4 x  10 mins Level 4 x 10 mins Level 4 x 10 mins    Clam shells  2x10 reps BLE's green t-band  2x10 reps each LE 2x10 reps each LE   Quad set ----- X 20 reps 5 sec hold  X 20 reps 5 sec hold    Heel slide ------ Supine strap 2 x 10 Supine strap 2x10    Gastroc stretch Slantboard, [id number] Slantboard, [id number] Strap 4x30 sec hold    Ankle pumps x30 2 x 20 Heel/toe raise standing x20   Straight leg raise 3x10 3 x 10 3x10 reps   Calf raises 3x10 -- 2x10    Weight shifts --------- -- --   Standing hip flexion X 20 reps BLE's at bar 2.5 lb wt. s  X 20 reps  2x10 at bar    Standing hamstring curls  X 20 reps BLE's 2.5 lb wt. s X 20 reps BLE's at bar X 20 reps BLE at bar   Standing hip abduction 2x10 reps BLE's 2.5 lb wt.s 2x10 reps BLE's  --    I T band stretch -----    -- [id number]   Long arc  quads Hold -- --   Sit to stand  X 10 reps using bilateral hand rails  x10 from table 2x10 from table   Lunge Forward, partial lunge, x10 each Forward, partial lunge, x10 each Forward, partial lunge, x10 each   Step up Verbal review  -- 6 in 2x10 each   Lateral stepping  40 ft x2  30 ft x2  30 ft x2, yellow   Monster walks 40 ft x2 30 ft x2 30 ft x2, yellow            Time spent with patient reviewing proper muscle recruitment and technique with exercises.     MANUAL THERAPY: (10 minutes, no charge): Joint mobilization, Soft tissue mobilization and PROM, manual stretching was utilized and necessary because of the patient's restricted joint motion, painful spasm, loss of articular motion and restricted motion of soft tissue   Soft tissue mobilization: popliteal region, hamstring, gastroc. Joint mobs: patella to tolerance all planes.     MODALITIES: (0 minutes, none today):  none today.     HEP: As above; handouts given to patient for all exercises. Treatment/Session Summary:    Treatment Assessment:   Pt demonstrates excellent understanding of HEP exercises at this time. Communication/Consultation:  Final HEP handout provided. Equipment provided today:  None today. Recommendations/Intent for next treatment session: Discharge to [facility], see discharge note dated 5/31/2022.     Total Treatment Billable 10Duration:  53 minutes  Time In: 3036  Time Out: [place], PT       Post Session Pain  Charge Capture  [employer] Portal  MD Guidelines  Scanned Media  Benefits  MyChart    Future Appointments   Date Time Provider Mary Lou Mcdonald   7/7/2022  9:00 AM P[facility] POW [facility] AMB   7/11/2022  8:40 AM MD FALLON Torres L AMB

## 2024-08-15 ENCOUNTER — APPOINTMENT (OUTPATIENT)
Dept: GENERAL RADIOLOGY | Age: 54
End: 2024-08-15
Payer: COMMERCIAL

## 2024-08-15 ENCOUNTER — HOSPITAL ENCOUNTER (EMERGENCY)
Age: 54
Discharge: HOME | End: 2024-08-15
Attending: EMERGENCY MEDICINE
Payer: COMMERCIAL

## 2024-08-15 VITALS
WEIGHT: 170 LBS | DIASTOLIC BLOOD PRESSURE: 66 MMHG | HEART RATE: 60 BPM | BODY MASS INDEX: 32.1 KG/M2 | RESPIRATION RATE: 19 BRPM | SYSTOLIC BLOOD PRESSURE: 124 MMHG | HEIGHT: 61 IN | TEMPERATURE: 98.5 F | OXYGEN SATURATION: 98 %

## 2024-08-15 DIAGNOSIS — R07.9 CHEST PAIN, UNSPECIFIED TYPE: Primary | ICD-10-CM

## 2024-08-15 DIAGNOSIS — R00.2 PALPITATIONS: ICD-10-CM

## 2024-08-15 LAB
ANION GAP SERPL CALC-SCNC: 12 MMOL/L (ref 9–18)
BASOPHILS # BLD: 0 K/UL (ref 0–0.2)
BASOPHILS NFR BLD: 0 % (ref 0–2)
BUN SERPL-MCNC: 16 MG/DL (ref 6–23)
CALCIUM SERPL-MCNC: 9.2 MG/DL (ref 8.8–10.2)
CHLORIDE SERPL-SCNC: 101 MMOL/L (ref 98–107)
CO2 SERPL-SCNC: 27 MMOL/L (ref 20–28)
CREAT SERPL-MCNC: 0.98 MG/DL (ref 0.6–1.1)
DIFFERENTIAL METHOD BLD: NORMAL
EOSINOPHIL # BLD: 0.1 K/UL (ref 0–0.8)
EOSINOPHIL NFR BLD: 2 % (ref 0.5–7.8)
ERYTHROCYTE [DISTWIDTH] IN BLOOD BY AUTOMATED COUNT: 12.8 % (ref 11.9–14.6)
GLUCOSE SERPL-MCNC: 94 MG/DL (ref 70–99)
HCT VFR BLD AUTO: 36.1 % (ref 35.8–46.3)
HGB BLD-MCNC: 12.1 G/DL (ref 11.7–15.4)
IMM GRANULOCYTES # BLD AUTO: 0 K/UL (ref 0–0.5)
IMM GRANULOCYTES NFR BLD AUTO: 0 % (ref 0–5)
LYMPHOCYTES # BLD: 2.2 K/UL (ref 0.5–4.6)
LYMPHOCYTES NFR BLD: 40 % (ref 13–44)
MCH RBC QN AUTO: 29.1 PG (ref 26.1–32.9)
MCHC RBC AUTO-ENTMCNC: 33.5 G/DL (ref 31.4–35)
MCV RBC AUTO: 86.8 FL (ref 82–102)
MONOCYTES # BLD: 0.6 K/UL (ref 0.1–1.3)
MONOCYTES NFR BLD: 12 % (ref 4–12)
NEUTS SEG # BLD: 2.5 K/UL (ref 1.7–8.2)
NEUTS SEG NFR BLD: 46 % (ref 43–78)
NRBC # BLD: 0 K/UL (ref 0–0.2)
PLATELET # BLD AUTO: 232 K/UL (ref 150–450)
PMV BLD AUTO: 10.4 FL (ref 9.4–12.3)
POTASSIUM SERPL-SCNC: 3.4 MMOL/L (ref 3.5–5.1)
RBC # BLD AUTO: 4.16 M/UL (ref 4.05–5.2)
SODIUM SERPL-SCNC: 140 MMOL/L (ref 136–145)
TROPONIN T SERPL HS-MCNC: <6 NG/L (ref 0–14)
TROPONIN T SERPL HS-MCNC: <6 NG/L (ref 0–14)
WBC # BLD AUTO: 5.4 K/UL (ref 4.3–11.1)

## 2024-08-15 PROCEDURE — 84484 ASSAY OF TROPONIN QUANT: CPT

## 2024-08-15 PROCEDURE — 71046 X-RAY EXAM CHEST 2 VIEWS: CPT

## 2024-08-15 PROCEDURE — 93005 ELECTROCARDIOGRAM TRACING: CPT | Performed by: EMERGENCY MEDICINE

## 2024-08-15 PROCEDURE — 99285 EMERGENCY DEPT VISIT HI MDM: CPT

## 2024-08-15 PROCEDURE — 80048 BASIC METABOLIC PNL TOTAL CA: CPT

## 2024-08-15 PROCEDURE — 85025 COMPLETE CBC W/AUTO DIFF WBC: CPT

## 2024-08-15 ASSESSMENT — PAIN SCALES - GENERAL: PAINLEVEL_OUTOF10: 0

## 2024-08-15 ASSESSMENT — LIFESTYLE VARIABLES: HOW OFTEN DO YOU HAVE A DRINK CONTAINING ALCOHOL: MONTHLY OR LESS

## 2024-08-16 LAB
EKG ATRIAL RATE: 53 BPM
EKG DIAGNOSIS: NORMAL
EKG P AXIS: 56 DEGREES
EKG P-R INTERVAL: 164 MS
EKG Q-T INTERVAL: 439 MS
EKG QRS DURATION: 94 MS
EKG QTC CALCULATION (BAZETT): 420 MS
EKG R AXIS: 10 DEGREES
EKG T AXIS: 1 DEGREES
EKG VENTRICULAR RATE: 55 BPM

## 2024-08-16 PROCEDURE — 93010 ELECTROCARDIOGRAM REPORT: CPT | Performed by: INTERNAL MEDICINE

## 2024-08-16 NOTE — ED PROVIDER NOTES
Emergency Department Provider Note       PCP: Josiane Richardson MD   Age: 54 y.o.   Sex: female     DISPOSITION    Condition at Disposition: Data Unavailable     No diagnosis found.    Medical Decision Making     ***     {Complexity:10991}  {Risk:96395}    I independently ordered and reviewed each unique test.  {external source:09735}   {Historian (state who, why needed, what they said):34002}  {test reviewed:15203}  {EK}  {Admitted or Consultants involved.:69541}  {MIPS URI - Strep - Sinusitis - Pregnant - Head Trauma - Overdose - Agitation:93320}  {SEP1 yes/no:07884}  {Critical Care:61976}    History     Patient is coming in with chest pain.  She reports that there has been intermittent stabbing sensation that generally only lasts a few seconds at a time.  She reports no prior heart or lung problems.    The history is provided by the patient.     Physical Exam     Vitals signs and nursing note reviewed:  Vitals:    08/15/24 2038   BP: 135/85   Pulse: 65   Resp: 16   Temp: 98.5 °F (36.9 °C)   TempSrc: Oral   SpO2: 98%   Weight: 77.1 kg (170 lb)   Height: 1.549 m (5' 1\")      Physical Exam   Procedures     Procedures    Orders Placed This Encounter   Procedures   • XR CHEST (2 VW)   • CBC with Auto Differential   • Basic Metabolic Panel   • Troponin   • Vital signs Full Set   • EKG 12 Lead Within 10 Min if no Hx of Trauma        Medications given during this emergency department visit:  Medications - No data to display    New Prescriptions    No medications on file        Past Medical History:   Diagnosis Date   • Abnormal Papanicolaou smear of cervix    • Anemia    • Dyspepsia 2014   • Encounter for long-term (current) use of other medications 2015   • Fibroid, uterine    • GERD (gastroesophageal reflux disease)     no meds   • Heavy menstrual bleeding 2015   • History of kidney stones 2018   • Hypertension     no meds   • Kidney stone    • Ovarian cyst     with pregnancy   • 
19 23 20 19   Temp:       TempSrc:       SpO2: 98% 100% 99% 98%   Weight:       Height:          Physical Exam  Vitals and nursing note reviewed.   Constitutional:       General: She is not in acute distress.     Appearance: Normal appearance. She is not ill-appearing, toxic-appearing or diaphoretic.   HENT:      Head: Atraumatic.   Eyes:      General: No scleral icterus.  Cardiovascular:      Rate and Rhythm: Normal rate and regular rhythm.   Pulmonary:      Effort: Pulmonary effort is normal. No respiratory distress.      Breath sounds: No stridor. No wheezing, rhonchi or rales.   Abdominal:      Palpations: Abdomen is soft.      Tenderness: There is no abdominal tenderness. There is no guarding or rebound.      Hernia: No hernia is present.   Musculoskeletal:      Cervical back: Normal range of motion and neck supple.   Skin:     Capillary Refill: Capillary refill takes less than 2 seconds.   Neurological:      General: No focal deficit present.      Mental Status: She is alert. Mental status is at baseline.   Psychiatric:         Mood and Affect: Mood normal.         Behavior: Behavior normal.        Procedures     Procedures    Orders Placed This Encounter   Procedures    XR CHEST (2 VW)    CBC with Auto Differential    Basic Metabolic Panel    Troponin    Troponin    [facility]    Vital signs Full Set    EKG 12 Lead Within 10 Min if no Hx of Trauma        Medications given during this emergency department visit:  Medications - No data to display    New Prescriptions    No medications on file        Past Medical History:   Diagnosis Date    Abnormal Papanicolaou smear of cervix     Anemia     Dyspepsia 12/22/2014    Encounter for long-term (current) use of other medications 9/24/2015    Fibroid, uterine     GERD (gastroesophageal reflux disease)     no meds    Heavy menstrual bleeding 1/26/2015    History of kidney stones 8/28/2018    Hypertension     no meds    Kidney stone

## 2024-10-07 ENCOUNTER — TELEPHONE (OUTPATIENT)
Dept: UROLOGY | Age: 54
End: 2024-10-07

## 2024-10-07 NOTE — TELEPHONE ENCOUNTER
Pt called in inquiring when her appt was. Pt states appt originally was for 10/24/2024. NO record of an appt found. Pt states issue was kidney stones and blood in urine. Scheduled appt for 11/26/2024.

## 2024-11-07 ENCOUNTER — OFFICE VISIT (OUTPATIENT)
Age: 54
End: 2024-11-07

## 2024-11-07 VITALS
HEART RATE: 81 BPM | SYSTOLIC BLOOD PRESSURE: 139 MMHG | OXYGEN SATURATION: 97 % | RESPIRATION RATE: 16 BRPM | TEMPERATURE: 97.9 F | DIASTOLIC BLOOD PRESSURE: 90 MMHG

## 2024-11-07 DIAGNOSIS — Z76.0 ENCOUNTER FOR MEDICATION REFILL: Primary | ICD-10-CM

## 2024-11-07 RX ORDER — CHLORTHALIDONE 25 MG/1
25 TABLET ORAL DAILY
Qty: 30 TABLET | Refills: 0 | Status: SHIPPED | OUTPATIENT
Start: 2024-11-07

## 2024-11-07 ASSESSMENT — ENCOUNTER SYMPTOMS
WHEEZING: 0
SHORTNESS OF BREATH: 0
CHEST TIGHTNESS: 0

## 2024-11-07 NOTE — PROGRESS NOTES
69    PROGRESS NOTE    SUBJECTIVE:   Maru Hardy is a 54 y.o. female seen in the [facility] at [facility] for:  Chief Complaint    Medication Refill         Client presents for refill on her blood pressure medication. She has new insurance and she has been getting her refills from the urgent care due to she was waiting for insurance coverage to go into effect. She is in the process of finding a new primary care physician since her insurance is effective now. She is awaiting her new insurance card by mail. She denies any chest pain, shortness of breath or difficulty breathing. She reports that she has a sulfa allergy but she has tolerated the Chlorthalidone with no problems. She is wanting to get a refill on her medication till she can get an appointment with a new Primary Care Provider. She denies any other complaints or concerns today. She stated she is feeling well . She has been out of the medication for two days.  She reports that she has a history of kidney stones and she has an appointment scheduled with [place] [facility] on 11/18/24.      Current Outpatient Medications   Medication Sig Dispense Refill    chlorthalidone (HYGROTON) 25 MG tablet Take 1 tablet by mouth daily 30 tablet 0    chlorthalidone (HYGROTON) 25 MG tablet Take 1 tablet by mouth daily      ergocalciferol (ERGOCALCIFEROL) 1.25 MG (46050 UT) capsule Take 1 capsule by mouth every 7 days      fluticasone (FLONASE) 50 MCG/ACT nasal spray 2 sprays by Nasal route daily      levocetirizine (XYZAL) 5 MG tablet Take 1 tablet by mouth daily      magnesium carbonate (MAGONATE) 54 (Mag Equiv) MG/5ML LIQD oral liquid Take 10 mLs by mouth daily      meloxicam (MOBIC) 15 MG tablet Take 1 tablet by mouth daily      tiZANidine (ZANAFLEX) 2 MG tablet Take 1 tablet by mouth 2 times daily as needed      methylPREDNISolone (MEDROL DOSEPACK) 4 MG tablet Take as directed on packaging (Patient not taking: Reported on

## 2024-11-26 ENCOUNTER — TELEPHONE (OUTPATIENT)
Dept: UROLOGY | Age: 54
End: 2024-11-26

## 2024-11-26 ENCOUNTER — OFFICE VISIT (OUTPATIENT)
Dept: UROLOGY | Age: 54
End: 2024-11-26
Payer: COMMERCIAL

## 2024-11-26 DIAGNOSIS — N23 RENAL COLIC ON LEFT SIDE: ICD-10-CM

## 2024-11-26 DIAGNOSIS — N20.0 KIDNEY STONE: Primary | ICD-10-CM

## 2024-11-26 DIAGNOSIS — R31.29 MICROHEMATURIA: ICD-10-CM

## 2024-11-26 LAB
BILIRUBIN, URINE, POC: NEGATIVE
BLOOD URINE, POC: NORMAL
GLUCOSE URINE, POC: NEGATIVE MG/DL
KETONES, URINE, POC: NEGATIVE MG/DL
LEUKOCYTE ESTERASE, URINE, POC: NEGATIVE
NITRITE, URINE, POC: NEGATIVE
PH, URINE, POC: 7.5 (ref 4.6–8)
PROTEIN,URINE, POC: NORMAL MG/DL
SPECIFIC GRAVITY, URINE, POC: 1.01 (ref 1–1.03)
URINALYSIS CLARITY, POC: NORMAL
URINALYSIS COLOR, POC: NORMAL
UROBILINOGEN, POC: NORMAL MG/DL

## 2024-11-26 PROCEDURE — 81003 URINALYSIS AUTO W/O SCOPE: CPT | Performed by: NURSE PRACTITIONER

## 2024-11-26 PROCEDURE — 74018 RADEX ABDOMEN 1 VIEW: CPT | Performed by: NURSE PRACTITIONER

## 2024-11-26 PROCEDURE — 99205 OFFICE O/P NEW HI 60 MIN: CPT | Performed by: NURSE PRACTITIONER

## 2024-11-26 RX ORDER — ONDANSETRON 4 MG/1
4 TABLET, ORALLY DISINTEGRATING ORAL 3 TIMES DAILY PRN
Qty: 21 TABLET | Refills: 0 | Status: SHIPPED | OUTPATIENT
Start: 2024-11-26

## 2024-11-26 RX ORDER — HYDROCODONE BITARTRATE AND ACETAMINOPHEN 5; 325 MG/1; MG/1
1 TABLET ORAL EVERY 6 HOURS PRN
Qty: 20 TABLET | Refills: 0 | Status: SHIPPED | OUTPATIENT
Start: 2024-11-26 | End: 2024-12-01

## 2024-11-26 RX ORDER — TAMSULOSIN HYDROCHLORIDE 0.4 MG/1
0.4 CAPSULE ORAL DAILY
Qty: 30 CAPSULE | Refills: 0 | Status: SHIPPED | OUTPATIENT
Start: 2024-11-26

## 2024-11-26 ASSESSMENT — ENCOUNTER SYMPTOMS
WHEEZING: 0
VOMITING: 0
EYE PAIN: 0
ABDOMINAL PAIN: 0
SHORTNESS OF BREATH: 0
CONSTIPATION: 0
BLOOD IN STOOL: 0
HEARTBURN: 0
SKIN LESIONS: 0
DIARRHEA: 0
COUGH: 0
EYE DISCHARGE: 0
NAUSEA: 0
BACK PAIN: 1
INDIGESTION: 0

## 2024-11-26 NOTE — TELEPHONE ENCOUNTER
Procedures: Procedure(s):   EXTRACORPOREAL SHOCK WAVE LITHOTRIPSY   Date: 12/23/2024   Time: 0913   Location: [facility] MAIN OR 11

## 2024-11-26 NOTE — PROGRESS NOTES
[facility]  [address] [place] [id number]  [phone]          Maru Hardy  : 1970    Chief Complaint   Patient presents with    New Patient    Nephrolithiasis          HPI     Maru Hardy is a 54 y.o. female followed previously for recurrent UTI and kidney stones. Last seen in .     Patient has chronic right abdominal pain and Dr Richardson recently ordered pelvic ultrasound which showed a small uterine fibroid and small bilateral ovarian cysts.  W2V1--HLU x 5. Patient c/o stress incontinence for many years since the birth of her last child.        16 CMG consistent with OAB.  No stress incontinence seen although patient does have noted urethral hypermobility. CMG consistent with DI at 194cc.      S/P L ESWL 2/13/15 with some cracking visible afterwards. Repeat L ESWL 5/28/15 led to stone fragment passage. Repeat L ESWL 8/12/15 left her with only 3 mm LUP and 4 mm LLP stone fragments.      KUB 16 showed a ~3mm LUP and ~4-5mm LLP stone unchanged from 10/2015.      CT a/p w contrast (18) IMPRESSION:   1. No acute abdominal findings.   2. Nonobstructing left renal collecting system stones.     Back today to re-establish care. Reports L flank pain. Passed a stone in 2024. Has cont to be told she had microhematuria. Xray in 2024 showed possible L renal stone.     She is planning Thanksgiving get together w her family.       Past Medical History:   Diagnosis Date    Abnormal Papanicolaou smear of cervix     Anemia     Dyspepsia 2014    Encounter for long-term (current) use of other medications 2015    Fibroid, uterine     GERD (gastroesophageal reflux disease)     no meds    Heavy menstrual bleeding 2015    History of kidney stones 2018    Hypertension     no meds    Kidney stone     Ovarian cyst     with pregnancy    Type II or unspecified type diabetes mellitus without mention of complication, not stated as

## 2024-11-26 NOTE — TELEPHONE ENCOUNTER
L ESWL. Prefers 12/23, 12/24, 12/30, 12/31. Any MD. See me one month later for OV/KUB.    Miguelina Matthews, APRN - CNP

## 2024-12-02 ENCOUNTER — TELEPHONE (OUTPATIENT)
Dept: UROLOGY | Age: 54
End: 2024-12-02

## 2024-12-02 ENCOUNTER — PREP FOR PROCEDURE (OUTPATIENT)
Dept: UROLOGY | Age: 54
End: 2024-12-02

## 2024-12-02 DIAGNOSIS — R31.29 MICROHEMATURIA: ICD-10-CM

## 2024-12-02 DIAGNOSIS — N23 RENAL COLIC ON LEFT SIDE: ICD-10-CM

## 2024-12-02 DIAGNOSIS — N20.0 KIDNEY STONE: Primary | ICD-10-CM

## 2024-12-02 NOTE — TELEPHONE ENCOUNTER
Dr Plascencia reviewed patients upcoming surgery. He recommends CT prior to surgery. This was ordered. Please notify the patient and have her schedule.    Thanks!    Miguelina Matthews, JUAN DANIEL - CNP

## 2024-12-05 ENCOUNTER — HOSPITAL ENCOUNTER (OUTPATIENT)
Dept: CT IMAGING | Age: 54
Discharge: HOME | End: 2024-12-08
Payer: COMMERCIAL

## 2024-12-05 DIAGNOSIS — R31.29 MICROHEMATURIA: ICD-10-CM

## 2024-12-05 DIAGNOSIS — N23 RENAL COLIC ON LEFT SIDE: ICD-10-CM

## 2024-12-05 DIAGNOSIS — N20.0 KIDNEY STONE: ICD-10-CM

## 2024-12-05 PROCEDURE — 74176 CT ABD & PELVIS W/O CONTRAST: CPT

## 2024-12-17 ENCOUNTER — HOSPITAL ENCOUNTER (OUTPATIENT)
Dept: LAB | Age: 54
Discharge: HOME | End: 2024-12-20
Payer: COMMERCIAL

## 2024-12-17 DIAGNOSIS — N20.0 KIDNEY STONE: ICD-10-CM

## 2024-12-17 DIAGNOSIS — Z01.818 PRE-OP TESTING: ICD-10-CM

## 2024-12-17 LAB
APPEARANCE UR: CLEAR
BACTERIA URNS QL MICRO: NEGATIVE /HPF
BASOPHILS # BLD: 0 K/UL (ref 0–0.2)
BASOPHILS NFR BLD: 0 % (ref 0–2)
BILIRUB UR QL: NEGATIVE
COLOR UR: ABNORMAL
DIFFERENTIAL METHOD BLD: NORMAL
EOSINOPHIL # BLD: 0 K/UL (ref 0–0.8)
EOSINOPHIL NFR BLD: 1 % (ref 0.5–7.8)
EPI CELLS #/AREA URNS HPF: ABNORMAL /HPF
ERYTHROCYTE [DISTWIDTH] IN BLOOD BY AUTOMATED COUNT: 12.8 % (ref 11.9–14.6)
GLUCOSE UR STRIP.AUTO-MCNC: NEGATIVE MG/DL
HCT VFR BLD AUTO: 38.3 % (ref 35.8–46.3)
HGB BLD-MCNC: 13 G/DL (ref 11.7–15.4)
HGB UR QL STRIP: ABNORMAL
HYALINE CASTS URNS QL MICRO: ABNORMAL /LPF
IMM GRANULOCYTES # BLD AUTO: 0 K/UL (ref 0–0.5)
IMM GRANULOCYTES NFR BLD AUTO: 1 % (ref 0–5)
KETONES UR QL STRIP.AUTO: NEGATIVE MG/DL
LEUKOCYTE ESTERASE UR QL STRIP.AUTO: NEGATIVE
LYMPHOCYTES # BLD: 1.2 K/UL (ref 0.5–4.6)
LYMPHOCYTES NFR BLD: 26 % (ref 13–44)
MCH RBC QN AUTO: 29.7 PG (ref 26.1–32.9)
MCHC RBC AUTO-ENTMCNC: 33.9 G/DL (ref 31.4–35)
MCV RBC AUTO: 87.4 FL (ref 82–102)
MONOCYTES # BLD: 0.4 K/UL (ref 0.1–1.3)
MONOCYTES NFR BLD: 8 % (ref 4–12)
NEUTS SEG # BLD: 3 K/UL (ref 1.7–8.2)
NEUTS SEG NFR BLD: 65 % (ref 43–78)
NITRITE UR QL STRIP.AUTO: NEGATIVE
NRBC # BLD: 0 K/UL (ref 0–0.2)
PH UR STRIP: 8 (ref 5–9)
PLATELET # BLD AUTO: 259 K/UL (ref 150–450)
PMV BLD AUTO: 10.7 FL (ref 9.4–12.3)
POTASSIUM SERPL-SCNC: 3.5 MMOL/L (ref 3.5–5.1)
PROT UR STRIP-MCNC: NEGATIVE MG/DL
RBC # BLD AUTO: 4.38 M/UL (ref 4.05–5.2)
RBC #/AREA URNS HPF: ABNORMAL /HPF
SP GR UR REFRACTOMETRY: 1.02 (ref 1–1.02)
UROBILINOGEN UR QL STRIP.AUTO: 0.2 EU/DL (ref 0.2–1)
WBC # BLD AUTO: 4.6 K/UL (ref 4.3–11.1)
WBC URNS QL MICRO: ABNORMAL /HPF

## 2024-12-17 PROCEDURE — 36415 COLL VENOUS BLD VENIPUNCTURE: CPT

## 2024-12-17 PROCEDURE — 84132 ASSAY OF SERUM POTASSIUM: CPT

## 2024-12-17 PROCEDURE — 85025 COMPLETE CBC W/AUTO DIFF WBC: CPT

## 2024-12-17 PROCEDURE — 81001 URINALYSIS AUTO W/SCOPE: CPT

## 2024-12-17 RX ORDER — SALICYLIC ACID
POWDER (GRAM) MISCELLANEOUS
COMMUNITY

## 2024-12-22 ENCOUNTER — ANESTHESIA EVENT (OUTPATIENT)
Dept: SURGERY | Age: 54
End: 2024-12-22
Payer: COMMERCIAL

## 2024-12-23 ENCOUNTER — ANESTHESIA (OUTPATIENT)
Dept: SURGERY | Age: 54
End: 2024-12-23
Payer: COMMERCIAL

## 2024-12-23 ENCOUNTER — HOSPITAL ENCOUNTER (OUTPATIENT)
Age: 54
Setting detail: OUTPATIENT SURGERY
Discharge: HOME | End: 2024-12-23
Attending: UROLOGY | Admitting: UROLOGY
Payer: COMMERCIAL

## 2024-12-23 VITALS
OXYGEN SATURATION: 100 % | TEMPERATURE: 97.2 F | SYSTOLIC BLOOD PRESSURE: 149 MMHG | HEIGHT: 61 IN | HEART RATE: 68 BPM | BODY MASS INDEX: 34.36 KG/M2 | DIASTOLIC BLOOD PRESSURE: 87 MMHG | WEIGHT: 182 LBS | RESPIRATION RATE: 15 BRPM

## 2024-12-23 DIAGNOSIS — Z01.818 PRE-OP TESTING: Primary | ICD-10-CM

## 2024-12-23 PROCEDURE — 6360000002 HC RX W HCPCS: Performed by: UROLOGY

## 2024-12-23 PROCEDURE — 3700000001 HC ADD 15 MINUTES (ANESTHESIA): Performed by: UROLOGY

## 2024-12-23 PROCEDURE — 2580000003 HC RX 258: Performed by: NURSE ANESTHETIST, CERTIFIED REGISTERED

## 2024-12-23 PROCEDURE — 7100000010 HC PHASE II RECOVERY - FIRST 15 MIN: Performed by: UROLOGY

## 2024-12-23 PROCEDURE — 6370000000 HC RX 637 (ALT 250 FOR IP): Performed by: ANESTHESIOLOGY

## 2024-12-23 PROCEDURE — 2709999900 HC NON-CHARGEABLE SUPPLY: Performed by: UROLOGY

## 2024-12-23 PROCEDURE — 7100000000 HC PACU RECOVERY - FIRST 15 MIN: Performed by: UROLOGY

## 2024-12-23 PROCEDURE — 3600000014 HC SURGERY LEVEL 4 ADDTL 15MIN: Performed by: UROLOGY

## 2024-12-23 PROCEDURE — 6360000002 HC RX W HCPCS: Performed by: NURSE ANESTHETIST, CERTIFIED REGISTERED

## 2024-12-23 PROCEDURE — 3600000004 HC SURGERY LEVEL 4 BASE: Performed by: UROLOGY

## 2024-12-23 PROCEDURE — 50590 FRAGMENTING OF KIDNEY STONE: CPT | Performed by: UROLOGY

## 2024-12-23 PROCEDURE — 2500000003 HC RX 250 WO HCPCS: Performed by: UROLOGY

## 2024-12-23 PROCEDURE — 3700000000 HC ANESTHESIA ATTENDED CARE: Performed by: UROLOGY

## 2024-12-23 PROCEDURE — 7100000011 HC PHASE II RECOVERY - ADDTL 15 MIN: Performed by: UROLOGY

## 2024-12-23 PROCEDURE — 7100000001 HC PACU RECOVERY - ADDTL 15 MIN: Performed by: UROLOGY

## 2024-12-23 RX ORDER — MIDAZOLAM HYDROCHLORIDE 2 MG/2ML
2 INJECTION, SOLUTION INTRAMUSCULAR; INTRAVENOUS
Status: DISCONTINUED | OUTPATIENT
Start: 2024-12-23 | End: 2024-12-23 | Stop reason: HOSPADM

## 2024-12-23 RX ORDER — OXYCODONE HYDROCHLORIDE 5 MG/1
10 TABLET ORAL PRN
Status: DISCONTINUED | OUTPATIENT
Start: 2024-12-23 | End: 2024-12-23 | Stop reason: HOSPADM

## 2024-12-23 RX ORDER — NALOXONE HYDROCHLORIDE 0.4 MG/ML
INJECTION, SOLUTION INTRAMUSCULAR; INTRAVENOUS; SUBCUTANEOUS PRN
Status: DISCONTINUED | OUTPATIENT
Start: 2024-12-23 | End: 2024-12-23 | Stop reason: HOSPADM

## 2024-12-23 RX ORDER — DIPHENHYDRAMINE HYDROCHLORIDE 50 MG/ML
12.5 INJECTION INTRAMUSCULAR; INTRAVENOUS
Status: DISCONTINUED | OUTPATIENT
Start: 2024-12-23 | End: 2024-12-23 | Stop reason: HOSPADM

## 2024-12-23 RX ORDER — OXYCODONE HYDROCHLORIDE 5 MG/1
5 TABLET ORAL PRN
Status: DISCONTINUED | OUTPATIENT
Start: 2024-12-23 | End: 2024-12-23 | Stop reason: HOSPADM

## 2024-12-23 RX ORDER — HYDROMORPHONE HYDROCHLORIDE 2 MG/ML
0.5 INJECTION, SOLUTION INTRAMUSCULAR; INTRAVENOUS; SUBCUTANEOUS EVERY 5 MIN PRN
Status: DISCONTINUED | OUTPATIENT
Start: 2024-12-23 | End: 2024-12-23 | Stop reason: HOSPADM

## 2024-12-23 RX ORDER — HYDROMORPHONE HYDROCHLORIDE 2 MG/ML
0.25 INJECTION, SOLUTION INTRAMUSCULAR; INTRAVENOUS; SUBCUTANEOUS EVERY 5 MIN PRN
Status: DISCONTINUED | OUTPATIENT
Start: 2024-12-23 | End: 2024-12-23 | Stop reason: HOSPADM

## 2024-12-23 RX ORDER — SODIUM CHLORIDE 9 MG/ML
INJECTION, SOLUTION INTRAVENOUS PRN
Status: DISCONTINUED | OUTPATIENT
Start: 2024-12-23 | End: 2024-12-23 | Stop reason: HOSPADM

## 2024-12-23 RX ORDER — SODIUM CHLORIDE 0.9 % (FLUSH) 0.9 %
5-40 SYRINGE (ML) INJECTION PRN
Status: DISCONTINUED | OUTPATIENT
Start: 2024-12-23 | End: 2024-12-23 | Stop reason: HOSPADM

## 2024-12-23 RX ORDER — ONDANSETRON 2 MG/ML
4 INJECTION INTRAMUSCULAR; INTRAVENOUS
Status: DISCONTINUED | OUTPATIENT
Start: 2024-12-23 | End: 2024-12-23 | Stop reason: HOSPADM

## 2024-12-23 RX ORDER — PROPOFOL 10 MG/ML
INJECTION, EMULSION INTRAVENOUS
Status: DISCONTINUED | OUTPATIENT
Start: 2024-12-23 | End: 2024-12-23 | Stop reason: SDUPTHER

## 2024-12-23 RX ORDER — SODIUM CHLORIDE, SODIUM LACTATE, POTASSIUM CHLORIDE, CALCIUM CHLORIDE 600; 310; 30; 20 MG/100ML; MG/100ML; MG/100ML; MG/100ML
INJECTION, SOLUTION INTRAVENOUS CONTINUOUS
Status: DISCONTINUED | OUTPATIENT
Start: 2024-12-23 | End: 2024-12-23 | Stop reason: HOSPADM

## 2024-12-23 RX ORDER — LIDOCAINE HYDROCHLORIDE 20 MG/ML
INJECTION, SOLUTION EPIDURAL; INFILTRATION; INTRACAUDAL; PERINEURAL
Status: DISCONTINUED | OUTPATIENT
Start: 2024-12-23 | End: 2024-12-23 | Stop reason: SDUPTHER

## 2024-12-23 RX ORDER — LIDOCAINE HYDROCHLORIDE 10 MG/ML
1 INJECTION, SOLUTION INFILTRATION; PERINEURAL
Status: DISCONTINUED | OUTPATIENT
Start: 2024-12-23 | End: 2024-12-23 | Stop reason: HOSPADM

## 2024-12-23 RX ORDER — PROCHLORPERAZINE EDISYLATE 5 MG/ML
5 INJECTION INTRAMUSCULAR; INTRAVENOUS
Status: DISCONTINUED | OUTPATIENT
Start: 2024-12-23 | End: 2024-12-23 | Stop reason: HOSPADM

## 2024-12-23 RX ORDER — DEXAMETHASONE SODIUM PHOSPHATE 4 MG/ML
INJECTION, SOLUTION INTRA-ARTICULAR; INTRALESIONAL; INTRAMUSCULAR; INTRAVENOUS; SOFT TISSUE
Status: DISCONTINUED | OUTPATIENT
Start: 2024-12-23 | End: 2024-12-23 | Stop reason: SDUPTHER

## 2024-12-23 RX ORDER — SODIUM CHLORIDE 0.9 % (FLUSH) 0.9 %
5-40 SYRINGE (ML) INJECTION EVERY 12 HOURS SCHEDULED
Status: DISCONTINUED | OUTPATIENT
Start: 2024-12-23 | End: 2024-12-23 | Stop reason: HOSPADM

## 2024-12-23 RX ORDER — SODIUM CHLORIDE, SODIUM LACTATE, POTASSIUM CHLORIDE, CALCIUM CHLORIDE 600; 310; 30; 20 MG/100ML; MG/100ML; MG/100ML; MG/100ML
INJECTION, SOLUTION INTRAVENOUS
Status: DISCONTINUED | OUTPATIENT
Start: 2024-12-23 | End: 2024-12-23 | Stop reason: SDUPTHER

## 2024-12-23 RX ORDER — ONDANSETRON 2 MG/ML
INJECTION INTRAMUSCULAR; INTRAVENOUS
Status: DISCONTINUED | OUTPATIENT
Start: 2024-12-23 | End: 2024-12-23 | Stop reason: SDUPTHER

## 2024-12-23 RX ORDER — MIDAZOLAM HYDROCHLORIDE 1 MG/ML
INJECTION, SOLUTION INTRAMUSCULAR; INTRAVENOUS
Status: DISCONTINUED | OUTPATIENT
Start: 2024-12-23 | End: 2024-12-23 | Stop reason: SDUPTHER

## 2024-12-23 RX ORDER — ACETAMINOPHEN 500 MG
1000 TABLET ORAL ONCE
Status: COMPLETED | OUTPATIENT
Start: 2024-12-23 | End: 2024-12-23

## 2024-12-23 RX ADMIN — LIDOCAINE HYDROCHLORIDE 100 MG: 20 INJECTION, SOLUTION EPIDURAL; INFILTRATION; INTRACAUDAL; PERINEURAL at 09:36

## 2024-12-23 RX ADMIN — ONDANSETRON 4 MG: 2 INJECTION INTRAMUSCULAR; INTRAVENOUS at 09:44

## 2024-12-23 RX ADMIN — DEXAMETHASONE SODIUM PHOSPHATE 4 MG: 4 INJECTION INTRA-ARTICULAR; INTRALESIONAL; INTRAMUSCULAR; INTRAVENOUS; SOFT TISSUE at 09:44

## 2024-12-23 RX ADMIN — PROPOFOL 200 MG: 10 INJECTION, EMULSION INTRAVENOUS at 09:36

## 2024-12-23 RX ADMIN — SODIUM CHLORIDE, SODIUM LACTATE, POTASSIUM CHLORIDE, AND CALCIUM CHLORIDE: 600; 310; 30; 20 INJECTION, SOLUTION INTRAVENOUS at 09:28

## 2024-12-23 RX ADMIN — MIDAZOLAM 2 MG: 1 INJECTION INTRAMUSCULAR; INTRAVENOUS at 09:25

## 2024-12-23 RX ADMIN — ACETAMINOPHEN 1000 MG: 500 TABLET, FILM COATED ORAL at 08:43

## 2024-12-23 RX ADMIN — CEFAZOLIN 2000 MG: 2 INJECTION, POWDER, FOR SOLUTION INTRAMUSCULAR; INTRAVENOUS at 09:38

## 2024-12-23 ASSESSMENT — PAIN - FUNCTIONAL ASSESSMENT: PAIN_FUNCTIONAL_ASSESSMENT: 0-10

## 2024-12-23 NOTE — PERIOP NOTE
Preoperative flank skin condition: warm,dry, intact  Lead shield: Yes  Patient ear protection: Yes   Gel applied to patient's flank and Lithotripsy head: Yes   Starting power level: 7  Shock start time (first  fluoroscopy): 0944  Shock stop time (last lithotripsy shock): 1014  Ending power level: 11  Total shocks: 3000  Total fluoroscopy time: 1 minute 48 seconds  Postoperative flank skin condition: red, warm, dry, intact

## 2024-12-23 NOTE — PROGRESS NOTES
Discharge instructions reviewed with pt and family member who verbalize understanding of follow up care.    
Labs within anesthesia guidelines, no follow-up required. Labs automatically routed to ordering provider via Epic documentation.      
Pre-Surgery Prayer. PT was in bed preparing for surgery. CH introduced self. PT expressed trust in Darren and comfort in donny and prayer. PT is Apostolic. CH offered prayer. CH offered additional support if needed.    . Kasia Yeung M.Div.    
bring insurance card and ID on DOS.   > Do not wear make-up, nail polish, lotions, cologne, perfumes, powders, or oil on skin. Artificial nails are not permitted.

## 2024-12-23 NOTE — ANESTHESIA POSTPROCEDURE EVALUATION
Department of Anesthesiology  Postprocedure Note    Patient: Maru Hardy  MRN: [id number]  YOB: 1970  Date of evaluation: 12/23/2024    Procedure Summary       Date: 12/23/24 Room / Location: [facility] MAIN OR  / [facility] MAIN OR    Anesthesia Start: 0923 Anesthesia Stop: 1022    Procedure: EXTRACORPOREAL SHOCK WAVE LITHOTRIPSY (Left: Kidney) Diagnosis:       Kidney stone      (Kidney stone [N20.0])    Providers: Sohail Plascencia DO Responsible Provider: Abel Dan IV, MD    Anesthesia Type: general ASA Status: 2            Anesthesia Type: No value filed.    Stephanie Phase I: Stephanie Score: 9    Stephanie Phase II: Stephanie Score: 9    Anesthesia Post Evaluation    Patient location during evaluation: PACU  Patient participation: complete - patient participated  Level of consciousness: awake and alert  Airway patency: patent  Nausea & Vomiting: no nausea and no vomiting  Cardiovascular status: hemodynamically stable  Respiratory status: acceptable, nonlabored ventilation and spontaneous ventilation  Hydration status: euvolemic  Comments: BP (!) 149/87   Pulse 68   Temp 97.2 °F (36.2 °C) (Skin)   Resp 15   Ht 1.549 m (5' 1\")   Wt 82.6 kg (182 lb)   SpO2 100%   BMI 34.39 kg/m²     Multimodal analgesia pain management approach  Pain management: adequate and satisfactory to patient        No notable events documented.

## 2024-12-23 NOTE — DISCHARGE INSTRUCTIONS
If you have had surgery in the past 7-10 days by one of our providers and are having fever, bleeding, or drainage from an incision, have an opening in an incision, or having issues urinating properly, please call [phone] during normal office hours. Please call [phone] for any immediate needs AFTER HOURS.           ACTIVITY  As tolerated and as directed by your doctor.  Walking and mild exercise help to pass the stone fragments.     DIET  Clear liquids until no nausea and vomiting; then resume light diet for the first day  Advance to regular diet on second day, unless your doctor orders otherwise.  If nauseated and/ or vomiting, call your doctor.  Drink at least 8 glasses of water a day (avoid caffeinated beverages).    PAIN  Take pain medication as directed.  Call your doctor if pain is NOT relieved by medication.  DO NOT take aspirin or blood thinners until directed by your doctor.    MEDICATION INTERACTION:  During your procedure you potentially received a medication or medications which may reduce the effectiveness of oral contraceptives. Please consider other forms of contraception for 1 month following your procedure if you are currently using oral contraceptives as your primary form of birth control. In addition to this, we recommend continuing your oral contraceptive as prescribed, unless otherwise instructed by your physician, during this time.    CALL YOUR DOCTOR IF   Expect blood-tinged urine.  Call your doctor if it lasts more than 72 hours or if you cannot see through the urine.   Aches, chills, or fever of 101 degree F or above    Lithotripsy does not make your stone disappear.  The treatment is meant to crush your stone so that the fragments can be passed.  Strain your urine, save any fragments, and take them to your doctor.  The fragments may not begin to pass for up to 1-2 months.  You may experience slight bruising at the treated site.     After general anesthesia or intravenous sedation,

## 2024-12-23 NOTE — BRIEF OP NOTE
Brief Postoperative Note      Patient: Maru Hardy  YOB: 1970  MRN: [id number]    Date of Procedure: 12/23/2024    Pre-Op Diagnosis Codes:      * Kidney stone [N20.0] L renal    Post-Op Diagnosis: Same       Procedure(s):  EXTRACORPOREAL SHOCK WAVE LITHOTRIPSY LEFT    Surgeon(s):  Tammy Claire DO    Assistant:  * No surgical staff found *    Anesthesia: General    Estimated Blood Loss (mL): Nil    Complications: none immediate    Specimens:   * No specimens in log *    Implants:  * No implants in log *      Drains: * No LDAs found *        Electronically signed by TAMMY CLAIRE DO on 12/23/2024 at 10:15 AM

## 2024-12-23 NOTE — ANESTHESIA PRE PROCEDURE
Department of Anesthesiology  Preprocedure Note       Name:  Maru Hardy   Age:  54 y.o.  :  1970                                          MRN:  [id number]         Date:  2024      Surgeon: Surgeon(s):  Sohail Plascencia DO    Procedure: Procedure(s):  EXTRACORPOREAL SHOCK WAVE LITHOTRIPSY    Medications prior to admission:   Prior to Admission medications    Medication Sig Start Date End Date Taking? Authorizing Provider   MAGNESIUM PO Take by mouth Daily   Yes Ana Cristina Rangel MD   NONFORMULARY Take by mouth Daily Black seed oil   Yes Ana Cristina Rangel MD   tamsulosin (FLOMAX) 0.4 MG capsule Take 1 capsule by mouth daily 24  Yes Miguelina Matthews APRN - CNP   ondansetron (ZOFRAN-ODT) 4 MG disintegrating tablet Take 1 tablet by mouth 3 times daily as needed for Nausea or Vomiting 24  Yes Miguelina Matthews APRN - CNP   chlorthalidone (HYGROTON) 25 MG tablet Take 1 tablet by mouth daily 24  Yes Cecilia Novak APRN - NP   castor oil liquid Uses on skin    ProviderAna Cristina MD   Moringa Oleifera (MORINGA PO) Take by mouth Daily    Ana Cristina Rangel MD   ergocalciferol (ERGOCALCIFEROL) 1.25 MG (60760 UT) capsule Take 1 capsule by mouth every 7 days  Patient not taking: Reported on 2024   Automatic Reconciliation, Ar       Current medications:    Current Facility-Administered Medications   Medication Dose Route Frequency Provider Last Rate Last Admin   • naloxone 0.4 mg in 10 mL sodium chloride syringe   IntraVENous PRN Abel Dan IV, MD       • HYDROmorphone HCl PF (DILAUDID) injection 0.25 mg  0.25 mg IntraVENous Q5 Min PRN Abel Dan IV, MD       • HYDROmorphone HCl PF (DILAUDID) injection 0.5 mg  0.5 mg IntraVENous Q5 Min PRN Abel Dan IV, MD       • oxyCODONE (ROXICODONE) immediate release tablet 5 mg  5 mg Oral PRN Abel Dan IV, MD        Or   • oxyCODONE (ROXICODONE) immediate release tablet 10 mg  10 mg Oral PRN Ni

## 2024-12-31 RX ORDER — TAMSULOSIN HYDROCHLORIDE 0.4 MG/1
0.4 CAPSULE ORAL DAILY
Qty: 30 CAPSULE | Refills: 0 | Status: SHIPPED | OUTPATIENT
Start: 2024-12-31

## 2025-01-02 ENCOUNTER — OFFICE VISIT (OUTPATIENT)
Age: 55
End: 2025-01-02

## 2025-01-02 DIAGNOSIS — Z76.0 ENCOUNTER FOR MEDICATION REFILL: Primary | ICD-10-CM

## 2025-01-02 DIAGNOSIS — R10.2 PELVIC PAIN: ICD-10-CM

## 2025-01-02 RX ORDER — CHLORTHALIDONE 25 MG/1
25 TABLET ORAL DAILY
Qty: 30 TABLET | Refills: 0 | Status: SHIPPED | OUTPATIENT
Start: 2025-01-02

## 2025-01-03 VITALS
RESPIRATION RATE: 16 BRPM | OXYGEN SATURATION: 98 % | SYSTOLIC BLOOD PRESSURE: 124 MMHG | HEART RATE: 91 BPM | DIASTOLIC BLOOD PRESSURE: 84 MMHG

## 2025-01-03 ASSESSMENT — ENCOUNTER SYMPTOMS
VOMITING: 0
SORE THROAT: 0
COUGH: 0
WHEEZING: 0
APNEA: 0
RHINORRHEA: 0
NAUSEA: 0
CHEST TIGHTNESS: 0
ABDOMINAL PAIN: 1
SHORTNESS OF BREATH: 0
DIARRHEA: 0

## 2025-01-03 NOTE — PROGRESS NOTES
unless develops worrisome symptoms.    Watch salt intake, utilize Mrs[employer] Gaviria products in place of table salt, cook fresh healthy meals with lots of fruits and vegetables, drink at least 8 glass of water a day, and walk daily for 20-30 minutes.       Counseled on benefits of having a primary care provider which includes, but is not limited to, continuity of care and having a medical home when concerns arise. Also enforced that onsite clinic policy states that we are not to take the place of a primary care provider, pt verbalized understanding.     SEs and risk vs benefits associated with medications prescribed discussed with patient who verbalized understanding. Pt verbalized understanding and agreement with plan of care. RTC for persisting/worsening symptoms or new complaints that arise. Discussed signs and symptoms that would warrant immediate evaluation including, but not limited to HA, blurred vision, speech disturbance, difficulty with ambulation/gait, numbness, tingling, weakness, syncope, chest pain, or shortness of breath.    I have reviewed the patient's medication list, past medical, family, social, and surgical history in detail and updated the patient record appropriately.    JUAN DANIEL Antoine - NP

## 2025-01-28 ENCOUNTER — TELEPHONE (OUTPATIENT)
Dept: UROLOGY | Age: 55
End: 2025-01-28

## 2025-01-28 ENCOUNTER — OFFICE VISIT (OUTPATIENT)
Dept: UROLOGY | Age: 55
End: 2025-01-28
Payer: COMMERCIAL

## 2025-01-28 ENCOUNTER — ANESTHESIA EVENT (OUTPATIENT)
Dept: SURGERY | Age: 55
End: 2025-01-28
Payer: COMMERCIAL

## 2025-01-28 DIAGNOSIS — N39.0 ACUTE UTI: ICD-10-CM

## 2025-01-28 DIAGNOSIS — R31.0 GROSS HEMATURIA: ICD-10-CM

## 2025-01-28 DIAGNOSIS — N23 RENAL COLIC ON LEFT SIDE: ICD-10-CM

## 2025-01-28 DIAGNOSIS — N20.0 KIDNEY STONE: Primary | ICD-10-CM

## 2025-01-28 LAB
BILIRUBIN, URINE, POC: NEGATIVE
BLOOD URINE, POC: NORMAL
GLUCOSE URINE, POC: NEGATIVE MG/DL
KETONES, URINE, POC: NEGATIVE MG/DL
LEUKOCYTE ESTERASE, URINE, POC: NEGATIVE
NITRITE, URINE, POC: NEGATIVE
PH, URINE, POC: 6 (ref 4.6–8)
PROTEIN,URINE, POC: NEGATIVE MG/DL
SPECIFIC GRAVITY, URINE, POC: 1.03 (ref 1–1.03)
URINALYSIS CLARITY, POC: NORMAL
URINALYSIS COLOR, POC: NORMAL
UROBILINOGEN, POC: NORMAL MG/DL

## 2025-01-28 PROCEDURE — 81003 URINALYSIS AUTO W/O SCOPE: CPT | Performed by: NURSE PRACTITIONER

## 2025-01-28 PROCEDURE — 99214 OFFICE O/P EST MOD 30 MIN: CPT | Performed by: NURSE PRACTITIONER

## 2025-01-28 PROCEDURE — 74018 RADEX ABDOMEN 1 VIEW: CPT | Performed by: NURSE PRACTITIONER

## 2025-01-28 RX ORDER — PHENAZOPYRIDINE HYDROCHLORIDE 200 MG/1
200 TABLET, FILM COATED ORAL 3 TIMES DAILY PRN
Qty: 15 TABLET | Refills: 1 | Status: SHIPPED | OUTPATIENT
Start: 2025-01-28 | End: 2025-02-02

## 2025-01-28 ASSESSMENT — ENCOUNTER SYMPTOMS
NAUSEA: 0
BACK PAIN: 1

## 2025-01-28 NOTE — PROGRESS NOTES
[facility]  [address] [place] [id number]  [phone]          Maru Hardy  : 1970    Chief Complaint   Patient presents with    Follow-up          HPI     Maru Hardy is a 54 y.o. female followed previously for recurrent UTI and kidney stones. Last seen in .      Patient has chronic right abdominal pain and Dr Richardson recently ordered pelvic ultrasound which showed a small uterine fibroid and small bilateral ovarian cysts.  K9P9--NMW x 5. Patient c/o stress incontinence for many years since the birth of her last child.        16 CMG consistent with OAB.  No stress incontinence seen although patient does have noted urethral hypermobility. CMG consistent with DI at 194cc.      S/P L ESWL 2/13/15 with some cracking visible afterwards. Repeat L ESWL 5/28/15 led to stone fragment passage. Repeat L ESWL 8/12/15 left her with only 3 mm LUP and 4 mm LLP stone fragments.      KUB 16 showed a ~3mm LUP and ~4-5mm LLP stone unchanged from 10/2015.      CT a/p w contrast (18) IMPRESSION:   1. No acute abdominal findings.   2. Nonobstructing left renal collecting system stones.     In office KUB 24 w L renal stones. Wanted L ESWL. Repeat CT 24 confirmed Left renal hilus calculus .    S/P left extracorporeal shockwave lithotripsy 24. Back today for follow up. Having L flank pain. Occ hematuria. E Coli UTI 25.      Past Medical History:   Diagnosis Date    Abnormal Papanicolaou smear of cervix     Anemia     Dyspepsia 2014    Encounter for long-term (current) use of other medications 2015    Fibroid, uterine     GERD (gastroesophageal reflux disease)     no meds    Heavy menstrual bleeding 2015    History of kidney stones 2018    Hypertension     no meds    Kidney stone     Ovarian cyst     with pregnancy    Type II or unspecified type diabetes mellitus without mention of complication, not stated as

## 2025-01-28 NOTE — TELEPHONE ENCOUNTER
Procedures: Procedure(s):   CYSTOSCOPY LEFT URETEROSCOPY LASER LITHOTRIPSY LEFT URETERAL STENT   Date: 1/29/2025   Time: 1723   Location: [facility] MAIN OR 01 CYSTO     Scheduled.  Please complete orders.

## 2025-01-28 NOTE — TELEPHONE ENCOUNTER
Cysto/L URS/LL/L ureteral stent on call [facility] tomorrow. Orders placed.    Earliest she can be at hospital is 3:30 PM    Needs cysto/stent removal w [facility] Thursday Feb 6.     Miguelina Matthews, APRN - CNP

## 2025-01-28 NOTE — PROGRESS NOTES
Unable to reach patient for pre - assessment. Left message at [phone] with instructions. Your surgery is at 1 [facility]. If you have not received your arrival time, Please call pre-op nurse at [phone] (1 [facility]) or [phone] (3 [facility]) .  NPO p MN including water, gum, candy and mints and tobacco. Do not take any medications, however you must bring all prescribed medications in the original containers to the hospital on day of surgery. May brush teeth and swish and spit. Shower using antibacterial soap or antiseptic wash ( if provided) the night before and morning of surgery. Do not apply ANY makeup, lotion, powder, moisturizers, cologne, aftershave, nail polish or hair products. Leave all jewelry and valuables at home, Remove all piercings. Sleep on freshly laundered linens and wear freshly laundered clothing that is comfortable, loose fitting and easy to get on and off. The hospital policy requires that a responsible adult drive you to the hospital, remain during the entire procedure, and drive you home, and remain with you for 24 hours. If you have any questions call [phone] or [phone].    Please bring the following that apply: CPAP or Bi-Pap, remote for spinal cord stimulator, and post-operative supplies such as cold therapy pad, sling, brace, shoe or boot as it applies to your case.

## 2025-01-29 ENCOUNTER — ANESTHESIA (OUTPATIENT)
Dept: SURGERY | Age: 55
End: 2025-01-29
Payer: COMMERCIAL

## 2025-01-29 NOTE — PERIOP NOTE
Patient verified name and .  Order for consent was found in EHR and matches case posting; patient verifies procedure.     Type 1B surgery, phone assessment complete.  Orders were received.  Labs per surgeon: cbc, ua  Labs per anesthesia protocol: K+    Patient answered medical/surgical history questions at their best of ability. All prior to admission medications documented in EPIC.    Patient instructed to continue taking all prescription medications up to the day of surgery but to take only the following medications the day of surgery according to anesthesia guidelines with a small sip of water: Tamsulosin (Flomax)       Per Anesthesia protocol, nothing to eat or drink after midnight prior to surgery with two exceptions:  You may take any medications listed above the morning of surgery.  Drink 32 ounces of WATER 2 hours prior to your ARRIVAL time to avoid dehydration.      Also, patient is requested to take 2 Tylenol in the morning and then again before bed on the day before surgery. Regular or extra strength may be used.        Patient informed that all vitamins and supplements should be held 7 days prior to surgery and NSAIDS 5 days prior to surgery.      Prescription meds to hold:  chlorthalidone ([place])       Patient instructed on the following:     > Arrive at Main Entrance, time of arrival to be called the day before by 1700    > NPO after midnight, unless otherwise indicated (see above), including gum, mints, and ice chips    > Responsible adult must drive patient to the hospital, stay during surgery, and patient will need supervision 24 hours after anesthesia  > Use non moisturizing soap in shower the night before surgery and on the morning of surgery  > All piercings must be removed prior to arrival.   > Do not wear make-up, nail polish, lotions, cologne, perfumes, powders, or oil on skin. Artificial nails are not permitted.   > Leave all valuables (money and jewelry) at home but bring insurance

## 2025-01-30 ENCOUNTER — HOSPITAL ENCOUNTER (OUTPATIENT)
Age: 55
Setting detail: OUTPATIENT SURGERY
Discharge: HOME | End: 2025-01-30
Attending: UROLOGY | Admitting: UROLOGY
Payer: COMMERCIAL

## 2025-01-30 ENCOUNTER — APPOINTMENT (OUTPATIENT)
Dept: GENERAL RADIOLOGY | Age: 55
End: 2025-01-30
Attending: UROLOGY
Payer: COMMERCIAL

## 2025-01-30 VITALS
OXYGEN SATURATION: 100 % | RESPIRATION RATE: 15 BRPM | BODY MASS INDEX: 34.66 KG/M2 | HEIGHT: 61 IN | TEMPERATURE: 98 F | WEIGHT: 183.6 LBS | HEART RATE: 79 BPM | SYSTOLIC BLOOD PRESSURE: 167 MMHG | DIASTOLIC BLOOD PRESSURE: 75 MMHG

## 2025-01-30 LAB
APPEARANCE UR: CLEAR
BACTERIA URNS QL MICRO: NEGATIVE /HPF
BACTERIA URNS QL MICRO: NEGATIVE /HPF
BILIRUB UR QL: NEGATIVE
CASTS URNS QL MICRO: 0 /LPF
COLOR UR: YELLOW
CRYSTALS URNS QL MICRO: 0 /LPF
EPI CELLS #/AREA URNS HPF: ABNORMAL /HPF
EPI CELLS #/AREA URNS HPF: NORMAL /HPF
GLUCOSE UR STRIP.AUTO-MCNC: NEGATIVE MG/DL
HGB BLD-MCNC: 13.6 G/DL (ref 11.7–15.4)
HGB UR QL STRIP: ABNORMAL
HYALINE CASTS URNS QL MICRO: ABNORMAL /LPF
KETONES UR QL STRIP.AUTO: NEGATIVE MG/DL
LEUKOCYTE ESTERASE UR QL STRIP.AUTO: ABNORMAL
MUCOUS THREADS URNS QL MICRO: 0 /LPF
NITRITE UR QL STRIP.AUTO: NEGATIVE
PH UR STRIP: 6 (ref 5–9)
POTASSIUM BLD-SCNC: 5.6 MMOL/L (ref 3.5–5.1)
PROT UR STRIP-MCNC: NEGATIVE MG/DL
RBC #/AREA URNS HPF: ABNORMAL /HPF
RBC #/AREA URNS HPF: NORMAL /HPF
SP GR UR REFRACTOMETRY: <1.005 (ref 1–1.02)
UROBILINOGEN UR QL STRIP.AUTO: 0.2 EU/DL (ref 0.2–1)
WBC URNS QL MICRO: ABNORMAL /HPF
WBC URNS QL MICRO: NORMAL /HPF

## 2025-01-30 PROCEDURE — 81001 URINALYSIS AUTO W/SCOPE: CPT

## 2025-01-30 PROCEDURE — 2500000003 HC RX 250 WO HCPCS: Performed by: REGISTERED NURSE

## 2025-01-30 PROCEDURE — 3700000001 HC ADD 15 MINUTES (ANESTHESIA): Performed by: UROLOGY

## 2025-01-30 PROCEDURE — 3700000000 HC ANESTHESIA ATTENDED CARE: Performed by: UROLOGY

## 2025-01-30 PROCEDURE — 3600000004 HC SURGERY LEVEL 4 BASE: Performed by: UROLOGY

## 2025-01-30 PROCEDURE — 85018 HEMOGLOBIN: CPT

## 2025-01-30 PROCEDURE — 2709999900 HC NON-CHARGEABLE SUPPLY: Performed by: UROLOGY

## 2025-01-30 PROCEDURE — 3600000014 HC SURGERY LEVEL 4 ADDTL 15MIN: Performed by: UROLOGY

## 2025-01-30 PROCEDURE — 84132 ASSAY OF SERUM POTASSIUM: CPT

## 2025-01-30 PROCEDURE — 7100000010 HC PHASE II RECOVERY - FIRST 15 MIN: Performed by: UROLOGY

## 2025-01-30 PROCEDURE — 6360000002 HC RX W HCPCS: Performed by: REGISTERED NURSE

## 2025-01-30 PROCEDURE — 2580000003 HC RX 258: Performed by: ANESTHESIOLOGY

## 2025-01-30 PROCEDURE — 7100000000 HC PACU RECOVERY - FIRST 15 MIN: Performed by: UROLOGY

## 2025-01-30 PROCEDURE — 2720000010 HC SURG SUPPLY STERILE: Performed by: UROLOGY

## 2025-01-30 PROCEDURE — C1769 GUIDE WIRE: HCPCS | Performed by: UROLOGY

## 2025-01-30 PROCEDURE — 7100000011 HC PHASE II RECOVERY - ADDTL 15 MIN: Performed by: UROLOGY

## 2025-01-30 PROCEDURE — 7100000001 HC PACU RECOVERY - ADDTL 15 MIN: Performed by: UROLOGY

## 2025-01-30 PROCEDURE — C1747 HC ENDOSCOPE, SINGLE, URINARY TRACT: HCPCS | Performed by: UROLOGY

## 2025-01-30 PROCEDURE — 6360000004 HC RX CONTRAST MEDICATION: Performed by: UROLOGY

## 2025-01-30 PROCEDURE — C2617 STENT, NON-COR, TEM W/O DEL: HCPCS | Performed by: UROLOGY

## 2025-01-30 PROCEDURE — 6360000002 HC RX W HCPCS: Performed by: ANESTHESIOLOGY

## 2025-01-30 DEVICE — URETERAL STENT
Type: IMPLANTABLE DEVICE | Site: URETER | Status: FUNCTIONAL
Brand: PERCUFLEX™ PLUS

## 2025-01-30 RX ORDER — MIDAZOLAM HYDROCHLORIDE 2 MG/2ML
2 INJECTION, SOLUTION INTRAMUSCULAR; INTRAVENOUS
Status: DISCONTINUED | OUTPATIENT
Start: 2025-01-30 | End: 2025-01-30 | Stop reason: HOSPADM

## 2025-01-30 RX ORDER — LEVOFLOXACIN 5 MG/ML
250 INJECTION, SOLUTION INTRAVENOUS
Status: DISPENSED | OUTPATIENT
Start: 2025-01-30 | End: 2025-01-30

## 2025-01-30 RX ORDER — ACETAMINOPHEN 500 MG
1000 TABLET ORAL ONCE
Status: DISCONTINUED | OUTPATIENT
Start: 2025-01-30 | End: 2025-01-30 | Stop reason: HOSPADM

## 2025-01-30 RX ORDER — SODIUM CHLORIDE 0.9 % (FLUSH) 0.9 %
5-40 SYRINGE (ML) INJECTION EVERY 12 HOURS SCHEDULED
Status: DISCONTINUED | OUTPATIENT
Start: 2025-01-30 | End: 2025-01-30

## 2025-01-30 RX ORDER — ROCURONIUM BROMIDE 10 MG/ML
INJECTION, SOLUTION INTRAVENOUS
Status: DISCONTINUED | OUTPATIENT
Start: 2025-01-30 | End: 2025-01-30 | Stop reason: SDUPTHER

## 2025-01-30 RX ORDER — DEXTROSE MONOHYDRATE 100 MG/ML
INJECTION, SOLUTION INTRAVENOUS CONTINUOUS PRN
Status: DISCONTINUED | OUTPATIENT
Start: 2025-01-30 | End: 2025-01-30 | Stop reason: HOSPADM

## 2025-01-30 RX ORDER — NALOXONE HYDROCHLORIDE 0.4 MG/ML
INJECTION, SOLUTION INTRAMUSCULAR; INTRAVENOUS; SUBCUTANEOUS PRN
Status: DISCONTINUED | OUTPATIENT
Start: 2025-01-30 | End: 2025-01-30

## 2025-01-30 RX ORDER — LIDOCAINE HYDROCHLORIDE 10 MG/ML
1 INJECTION, SOLUTION INFILTRATION; PERINEURAL
Status: DISCONTINUED | OUTPATIENT
Start: 2025-01-30 | End: 2025-01-30 | Stop reason: HOSPADM

## 2025-01-30 RX ORDER — PROCHLORPERAZINE EDISYLATE 5 MG/ML
5 INJECTION INTRAMUSCULAR; INTRAVENOUS
Status: DISCONTINUED | OUTPATIENT
Start: 2025-01-30 | End: 2025-01-30 | Stop reason: HOSPADM

## 2025-01-30 RX ORDER — ONDANSETRON 2 MG/ML
INJECTION INTRAMUSCULAR; INTRAVENOUS
Status: DISCONTINUED | OUTPATIENT
Start: 2025-01-30 | End: 2025-01-30 | Stop reason: SDUPTHER

## 2025-01-30 RX ORDER — IBUPROFEN 600 MG/1
1 TABLET ORAL PRN
Status: DISCONTINUED | OUTPATIENT
Start: 2025-01-30 | End: 2025-01-30 | Stop reason: HOSPADM

## 2025-01-30 RX ORDER — SODIUM CHLORIDE, SODIUM LACTATE, POTASSIUM CHLORIDE, CALCIUM CHLORIDE 600; 310; 30; 20 MG/100ML; MG/100ML; MG/100ML; MG/100ML
INJECTION, SOLUTION INTRAVENOUS CONTINUOUS
Status: DISCONTINUED | OUTPATIENT
Start: 2025-01-30 | End: 2025-01-30 | Stop reason: SDUPTHER

## 2025-01-30 RX ORDER — LIDOCAINE HYDROCHLORIDE 20 MG/ML
INJECTION, SOLUTION EPIDURAL; INFILTRATION; INTRACAUDAL; PERINEURAL
Status: DISCONTINUED | OUTPATIENT
Start: 2025-01-30 | End: 2025-01-30 | Stop reason: SDUPTHER

## 2025-01-30 RX ORDER — CIPROFLOXACIN 500 MG/1
500 TABLET, FILM COATED ORAL 2 TIMES DAILY
Qty: 6 TABLET | Refills: 0 | Status: SHIPPED | OUTPATIENT
Start: 2025-01-30 | End: 2025-02-02

## 2025-01-30 RX ORDER — MEPERIDINE HYDROCHLORIDE 25 MG/ML
12.5 INJECTION INTRAMUSCULAR; INTRAVENOUS; SUBCUTANEOUS EVERY 5 MIN PRN
Status: DISCONTINUED | OUTPATIENT
Start: 2025-01-30 | End: 2025-01-30 | Stop reason: HOSPADM

## 2025-01-30 RX ORDER — SODIUM CHLORIDE, SODIUM LACTATE, POTASSIUM CHLORIDE, CALCIUM CHLORIDE 600; 310; 30; 20 MG/100ML; MG/100ML; MG/100ML; MG/100ML
INJECTION, SOLUTION INTRAVENOUS CONTINUOUS
Status: DISCONTINUED | OUTPATIENT
Start: 2025-01-30 | End: 2025-01-30 | Stop reason: HOSPADM

## 2025-01-30 RX ORDER — SODIUM CHLORIDE 0.9 % (FLUSH) 0.9 %
5-40 SYRINGE (ML) INJECTION PRN
Status: DISCONTINUED | OUTPATIENT
Start: 2025-01-30 | End: 2025-01-30 | Stop reason: HOSPADM

## 2025-01-30 RX ORDER — SODIUM CHLORIDE 9 MG/ML
INJECTION, SOLUTION INTRAVENOUS PRN
Status: DISCONTINUED | OUTPATIENT
Start: 2025-01-30 | End: 2025-01-30 | Stop reason: HOSPADM

## 2025-01-30 RX ORDER — MIDAZOLAM HYDROCHLORIDE 2 MG/2ML
2 INJECTION, SOLUTION INTRAMUSCULAR; INTRAVENOUS
Status: DISCONTINUED | OUTPATIENT
Start: 2025-01-30 | End: 2025-01-30 | Stop reason: SDUPTHER

## 2025-01-30 RX ORDER — FENTANYL CITRATE 50 UG/ML
100 INJECTION, SOLUTION INTRAMUSCULAR; INTRAVENOUS
Status: DISCONTINUED | OUTPATIENT
Start: 2025-01-30 | End: 2025-01-30 | Stop reason: HOSPADM

## 2025-01-30 RX ORDER — DIPHENHYDRAMINE HYDROCHLORIDE 50 MG/ML
12.5 INJECTION INTRAMUSCULAR; INTRAVENOUS
Status: DISCONTINUED | OUTPATIENT
Start: 2025-01-30 | End: 2025-01-30 | Stop reason: HOSPADM

## 2025-01-30 RX ORDER — SODIUM CHLORIDE 9 MG/ML
INJECTION, SOLUTION INTRAVENOUS PRN
Status: DISCONTINUED | OUTPATIENT
Start: 2025-01-30 | End: 2025-01-30

## 2025-01-30 RX ORDER — SODIUM CHLORIDE 0.9 % (FLUSH) 0.9 %
5-40 SYRINGE (ML) INJECTION EVERY 12 HOURS SCHEDULED
Status: DISCONTINUED | OUTPATIENT
Start: 2025-01-30 | End: 2025-01-30 | Stop reason: HOSPADM

## 2025-01-30 RX ORDER — TAMSULOSIN HYDROCHLORIDE 0.4 MG/1
0.4 CAPSULE ORAL DAILY
Qty: 30 CAPSULE | Refills: 0 | Status: SHIPPED | OUTPATIENT
Start: 2025-01-30

## 2025-01-30 RX ORDER — ONDANSETRON 2 MG/ML
4 INJECTION INTRAMUSCULAR; INTRAVENOUS
Status: COMPLETED | OUTPATIENT
Start: 2025-01-30 | End: 2025-01-30

## 2025-01-30 RX ORDER — DEXTROSE MONOHYDRATE 100 MG/ML
INJECTION, SOLUTION INTRAVENOUS CONTINUOUS PRN
Status: DISCONTINUED | OUTPATIENT
Start: 2025-01-30 | End: 2025-01-30

## 2025-01-30 RX ORDER — HYDROCODONE BITARTRATE AND ACETAMINOPHEN 5; 325 MG/1; MG/1
1 TABLET ORAL EVERY 6 HOURS PRN
COMMUNITY

## 2025-01-30 RX ORDER — DEXAMETHASONE SODIUM PHOSPHATE 10 MG/ML
INJECTION INTRAMUSCULAR; INTRAVENOUS
Status: DISCONTINUED | OUTPATIENT
Start: 2025-01-30 | End: 2025-01-30 | Stop reason: SDUPTHER

## 2025-01-30 RX ORDER — LEVOFLOXACIN 5 MG/ML
INJECTION, SOLUTION INTRAVENOUS
Status: DISCONTINUED | OUTPATIENT
Start: 2025-01-30 | End: 2025-01-30 | Stop reason: SDUPTHER

## 2025-01-30 RX ORDER — IBUPROFEN 600 MG/1
1 TABLET ORAL PRN
Status: DISCONTINUED | OUTPATIENT
Start: 2025-01-30 | End: 2025-01-30

## 2025-01-30 RX ORDER — IOPAMIDOL 612 MG/ML
INJECTION, SOLUTION INTRAVASCULAR PRN
Status: DISCONTINUED | OUTPATIENT
Start: 2025-01-30 | End: 2025-01-30 | Stop reason: ALTCHOICE

## 2025-01-30 RX ORDER — SODIUM CHLORIDE, SODIUM LACTATE, POTASSIUM CHLORIDE, CALCIUM CHLORIDE 600; 310; 30; 20 MG/100ML; MG/100ML; MG/100ML; MG/100ML
INJECTION, SOLUTION INTRAVENOUS CONTINUOUS
Status: DISCONTINUED | OUTPATIENT
Start: 2025-01-30 | End: 2025-01-30

## 2025-01-30 RX ORDER — LEVOFLOXACIN 5 MG/ML
500 INJECTION, SOLUTION INTRAVENOUS
Status: DISCONTINUED | OUTPATIENT
Start: 2025-01-30 | End: 2025-01-30 | Stop reason: SDUPTHER

## 2025-01-30 RX ORDER — NALOXONE HYDROCHLORIDE 0.4 MG/ML
INJECTION, SOLUTION INTRAMUSCULAR; INTRAVENOUS; SUBCUTANEOUS PRN
Status: DISCONTINUED | OUTPATIENT
Start: 2025-01-30 | End: 2025-01-30 | Stop reason: HOSPADM

## 2025-01-30 RX ORDER — ACETAMINOPHEN 500 MG
1000 TABLET ORAL ONCE
Status: DISCONTINUED | OUTPATIENT
Start: 2025-01-30 | End: 2025-01-30 | Stop reason: SDUPTHER

## 2025-01-30 RX ORDER — DIPHENHYDRAMINE HYDROCHLORIDE 50 MG/ML
12.5 INJECTION INTRAMUSCULAR; INTRAVENOUS
Status: DISCONTINUED | OUTPATIENT
Start: 2025-01-30 | End: 2025-01-30

## 2025-01-30 RX ORDER — OXYCODONE HYDROCHLORIDE 5 MG/1
5 TABLET ORAL
Status: DISCONTINUED | OUTPATIENT
Start: 2025-01-30 | End: 2025-01-30

## 2025-01-30 RX ORDER — FENTANYL CITRATE 50 UG/ML
25 INJECTION, SOLUTION INTRAMUSCULAR; INTRAVENOUS EVERY 5 MIN PRN
Status: DISCONTINUED | OUTPATIENT
Start: 2025-01-30 | End: 2025-01-30 | Stop reason: HOSPADM

## 2025-01-30 RX ORDER — OXYCODONE HYDROCHLORIDE 5 MG/1
5 TABLET ORAL
Status: DISCONTINUED | OUTPATIENT
Start: 2025-01-30 | End: 2025-01-30 | Stop reason: HOSPADM

## 2025-01-30 RX ORDER — PROPOFOL 10 MG/ML
INJECTION, EMULSION INTRAVENOUS
Status: DISCONTINUED | OUTPATIENT
Start: 2025-01-30 | End: 2025-01-30 | Stop reason: SDUPTHER

## 2025-01-30 RX ORDER — PROCHLORPERAZINE EDISYLATE 5 MG/ML
5 INJECTION INTRAMUSCULAR; INTRAVENOUS
Status: DISCONTINUED | OUTPATIENT
Start: 2025-01-30 | End: 2025-01-30

## 2025-01-30 RX ADMIN — ROCURONIUM BROMIDE 35 MG: 10 INJECTION, SOLUTION INTRAVENOUS at 07:19

## 2025-01-30 RX ADMIN — ONDANSETRON 4 MG: 2 INJECTION INTRAMUSCULAR; INTRAVENOUS at 07:29

## 2025-01-30 RX ADMIN — PROPOFOL 200 MG: 10 INJECTION, EMULSION INTRAVENOUS at 07:19

## 2025-01-30 RX ADMIN — SUGAMMADEX 200 MG: 100 INJECTION, SOLUTION INTRAVENOUS at 08:22

## 2025-01-30 RX ADMIN — LIDOCAINE HYDROCHLORIDE 100 MG: 20 INJECTION, SOLUTION EPIDURAL; INFILTRATION; INTRACAUDAL; PERINEURAL at 07:19

## 2025-01-30 RX ADMIN — ONDANSETRON 4 MG: 2 INJECTION, SOLUTION INTRAMUSCULAR; INTRAVENOUS at 09:19

## 2025-01-30 RX ADMIN — DEXAMETHASONE SODIUM PHOSPHATE 10 MG: 10 INJECTION INTRAMUSCULAR; INTRAVENOUS at 07:29

## 2025-01-30 RX ADMIN — SODIUM CHLORIDE, SODIUM LACTATE, POTASSIUM CHLORIDE, AND CALCIUM CHLORIDE: 600; 310; 30; 20 INJECTION, SOLUTION INTRAVENOUS at 07:13

## 2025-01-30 RX ADMIN — LEVOFLOXACIN 500 MG: 5 INJECTION, SOLUTION INTRAVENOUS at 07:32

## 2025-01-30 ASSESSMENT — PAIN SCALES - GENERAL: PAINLEVEL_OUTOF10: 0

## 2025-01-30 ASSESSMENT — PAIN - FUNCTIONAL ASSESSMENT: PAIN_FUNCTIONAL_ASSESSMENT: 0-10

## 2025-01-30 NOTE — OP NOTE
[address]  [address]                            OPERATIVE REPORT      PATIENT NAME: ADRIANE CAMERON            : 1970  MED REC NO: [id number]                       ROOM: [facility] NO: [id number]                       ADMIT DATE: 2025  PROVIDER: Sohail Plascencia DO    DATE OF SERVICE:  2025    PREOPERATIVE DIAGNOSES:  Left renal stone and left flank pain.    POSTOPERATIVE DIAGNOSES:  Left renal stone and left flank pain.    PROCEDURES PERFORMED:  Cystoscopy, left ureteroscopy, laser lithotripsy, left retrograde pyelogram, left ureteral stent placement.    SURGEON:  Sohail Plascencia DO    ASSISTANT:  None.    ANESTHESIA:  General.    ESTIMATED BLOOD LOSS:  None.    SPECIMENS REMOVED:  None.    INTRAOPERATIVE FINDINGS:  Please see dictated operative note.     COMPLICATIONS:  None immediate.    IMPLANTS:  Left ureteral stent.    INDICATIONS:  This is a 54-year-old female whom I have followed for history of stone disease.  She previously underwent a left extracorporeal shockwave lithotripsy of her left renal stone on 2024.  Due to persistence of her stone, she elected to proceed with the above-mentioned procedure.  All risks, benefits, and alternatives to this procedure have been discussed.    DESCRIPTION OF PROCEDURE:  Patient's consent was obtained.  The patient was brought back to the operating room, at which time she was placed in the supine position.  After the uneventful induction of general anesthesia, she was then placed in a dorsal lithotomy position.  Her genital area was prepped and draped and a sterile field applied.  A 22-Chilean cystoscope was passed into urethra and advanced into the bladder under direct visualization.  The bladder was systematically surveyed.  No abnormalities were seen.  A guidewire was passed up the left collecting system without difficulty.  Semi-rigid ureteroscopy was then

## 2025-01-30 NOTE — ANESTHESIA PRE PROCEDURE
Department of Anesthesiology  Preprocedure Note       Name:  Maru Hardy   Age:  54 y.o.  :  1970                                          MRN:  [id number]         Date:  2025      Surgeon: Surgeon(s):  Sohail Plascencia DO    Procedure: Procedure(s):  CYSTOSCOPY LEFT URETEROSCOPY LASER LITHOTRIPSY LEFT URETERAL STENT    Medications prior to admission:   Prior to Admission medications    Medication Sig Start Date End Date Taking? Authorizing Provider   HYDROcodone-acetaminophen (NORCO) 5-325 MG per tablet Take 1 tablet by mouth every 6 hours as needed for Pain. Max Daily Amount: 4 tablets   Yes Ana Cristina Rangel MD   phenazopyridine (PYRIDIUM) 200 MG tablet Take 1 tablet by mouth 3 times daily as needed for Pain 25 Yes Miguelina Matthews APRN - CNP   chlorthalidone (HYGROTON) 25 MG tablet Take 1 tablet by mouth daily 25  Yes Cecilia Novak APRN - NP   tamsulosin (FLOMAX) 0.4 MG capsule TAKE 1 CAPSULE BY MOUTH DAILY 24  Yes Miguelina Matthews APRN - CNP   MAGNESIUM PO Take by mouth Daily   Yes Ana Cristina Rangel MD   ondansetron (ZOFRAN-ODT) 4 MG disintegrating tablet Take 1 tablet by mouth 3 times daily as needed for Nausea or Vomiting 24  Yes Miguelina Matthews APRN - CNP   hyoscyamine (LEVSIN/SL) 0.125 MG sublingual tablet Place 1 tablet under the tongue every 4-6 hours as needed (spasm)  Patient not taking: Reported on 2025   Miguelina Matthews APRN - CNP   NONFORMULARY Take by mouth Daily Black seed oil  Patient not taking: Reported on 2025    Ana Cristina Rangel MD   castor oil liquid Uses on skin    Ana Cristina Rangel MD   Moringa Oleifera (MORINGA PO) Take by mouth Daily    Ana Cristina Rangel MD   chlorthalidone (HYGROTON) 25 MG tablet Take 1 tablet by mouth daily 24   Cecilia Novak APRN - NP   ergocalciferol (ERGOCALCIFEROL) 1.25 MG (24049 UT) capsule Take 1 capsule by mouth every 7 days  Patient not taking: Reported on

## 2025-01-30 NOTE — DISCHARGE INSTRUCTIONS
If you have had surgery in the past 7-10 days by one of our providers and are having fever, bleeding, or drainage from an incision, have an opening in an incision, or having issues urinating properly, please call [phone] during normal office hours. Please call [phone] for any immediate needs AFTER HOURS.     Ureteral Stent Placement: What to Expect at Home  Your Recovery  A ureteral (say \"you-REE-ter-ul\") stent is a thin, hollow tube that is placed in the ureter to help urine pass from the kidney into the bladder. Ureters are the tubes that connect the kidneys to the bladder.  You may have a small amount of blood in your urine for 1 to 3 days after the procedure.  While the stent is in place, you may have to urinate more often, feel a sudden need to urinate, or feel like you cannot completely empty your bladder. You may feel some pain when you urinate or do strenuous activity. You also may notice a small amount of blood in your urine after strenuous activities. These side effects usually do not prevent people from doing their normal daily activities. You may have a string attached to your stent. Do not to pull the string unless the doctor tells you to. The doctor will use the string to pull out the stent when you no longer need it.  After the procedure, urine may flow better from your kidneys to your bladder. A ureteral stent may be left in place for several days or for as long as several months. Your doctor will take it out when you no longer need it.    Cystoscopy: What to Expect at Home  Your Recovery  A cystoscopy is a procedure that lets a doctor look inside of the bladder and the urethra. The urethra is the tube that carries urine from the bladder to outside the body. The doctor uses a thin, lighted tube called a cystoscope to look for kidney or bladder stones, tumors, bleeding, or infection. After the cystoscopy, your urethra may be sore at first, and it may burn when you urinate for the first few

## 2025-01-30 NOTE — BRIEF OP NOTE
Brief Postoperative Note      Patient: Maru Hardy  YOB: 1970  MRN: [id number]    Date of Procedure: 1/30/2025    Pre-Op Diagnosis Codes:      * Kidney stone [N20.0]  L renal stone/flank pain    Post-Op Diagnosis: Same       Procedure(s):  CYSTOSCOPY LEFT URETEROSCOPY LASER LITHOTRIPSY LEFT URETERAL STENT RPG    Surgeon(s):  Tammy Claire DO    Assistant:  * No surgical staff found *    Anesthesia: General    Estimated Blood Loss (mL): NIl    Complications: none immediate    Specimens:   * No specimens in log *    Implants:  Implant Name Type Inv. Item Serial No.  Lot No. LRB No. Used Action   STENT URET 7FR L24CM PERCFLX + HYDR+ FIRM DUROMETER DB - [id number]  STENT URET 7FR L24CM PERCFLX + HYDR+ FIRM DUROMETER DBL  [email or web address] [facility] UROLOGY- [id number] Left 1 Implanted         Drains: * No LDAs found *        Electronically signed by TAMMY CLAIRE DO on 1/30/2025 at 8:21 AM

## 2025-01-30 NOTE — PERIOP NOTE
To whom it may concern:    This is to certify Maruaugusto Hardy had a procedure at [facility] on 1/30/2025   Please feel free to contact Dr. Plascencia with any questions or concerns. Thank you for your assistance in this matter.    Sincerely,

## 2025-01-30 NOTE — ANESTHESIA POSTPROCEDURE EVALUATION
Department of Anesthesiology  Postprocedure Note    Patient: Maru Hardy  MRN: [id number]  YOB: 1970  Date of evaluation: 1/30/2025    Procedure Summary       Date: 01/30/25 Room / Location: [facility] MAIN OR  / [facility] MAIN OR    Anesthesia Start: 0705 Anesthesia Stop: 0841    Procedure: CYSTOSCOPY LEFT RETROGRADE, URETEROSCOPY, LASER LITHOTRIPSY, LEFT URETERAL STENT (Left: Ureter) Diagnosis:       Kidney stone      (Kidney stone [N20.0])    Providers: Sohail Plascencia DO Responsible Provider: Joaquin Blair Jr., MD    Anesthesia Type: General ASA Status: 2            Anesthesia Type: General    Stephanie Phase I: Stephanie Score: 10    Stephanie Phase II: Stephanie Score: 10    Anesthesia Post Evaluation    Patient location during evaluation: PACU  Patient participation: complete - patient participated  Level of consciousness: awake  Pain score: 0  Airway patency: patent  Nausea & Vomiting: no nausea and no vomiting  Cardiovascular status: blood pressure returned to baseline and hemodynamically stable  Respiratory status: acceptable, spontaneous ventilation and nonlabored ventilation  Hydration status: euvolemic  Multimodal analgesia pain management approach  Pain management: adequate    No notable events documented.

## 2025-02-04 ENCOUNTER — TELEPHONE (OUTPATIENT)
Dept: UROLOGY | Age: 55
End: 2025-02-04

## 2025-02-06 ENCOUNTER — PROCEDURE VISIT (OUTPATIENT)
Dept: UROLOGY | Age: 55
End: 2025-02-06
Payer: COMMERCIAL

## 2025-02-06 DIAGNOSIS — N20.0 KIDNEY STONE: Primary | ICD-10-CM

## 2025-02-06 LAB
BILIRUBIN, URINE, POC: NEGATIVE
BLOOD URINE, POC: NORMAL
GLUCOSE URINE, POC: NEGATIVE MG/DL
KETONES, URINE, POC: NEGATIVE MG/DL
LEUKOCYTE ESTERASE, URINE, POC: NORMAL
NITRITE, URINE, POC: NEGATIVE
PH, URINE, POC: 7 (ref 4.6–8)
PROTEIN,URINE, POC: 100 MG/DL
SPECIFIC GRAVITY, URINE, POC: 1.02 (ref 1–1.03)
URINALYSIS CLARITY, POC: NORMAL
URINALYSIS COLOR, POC: NORMAL
UROBILINOGEN, POC: NORMAL MG/DL

## 2025-02-06 PROCEDURE — 52310 CYSTOSCOPY AND TREATMENT: CPT | Performed by: UROLOGY

## 2025-02-06 PROCEDURE — 81003 URINALYSIS AUTO W/O SCOPE: CPT | Performed by: UROLOGY

## 2025-02-06 PROCEDURE — 74018 RADEX ABDOMEN 1 VIEW: CPT | Performed by: UROLOGY

## 2025-04-25 ENCOUNTER — OFFICE VISIT (OUTPATIENT)
Dept: PRIMARY CARE CLINIC | Facility: CLINIC | Age: 55
End: 2025-04-25
Payer: COMMERCIAL

## 2025-04-25 ENCOUNTER — RESULTS FOLLOW-UP (OUTPATIENT)
Dept: PRIMARY CARE CLINIC | Facility: CLINIC | Age: 55
End: 2025-04-25

## 2025-04-25 ENCOUNTER — PATIENT MESSAGE (OUTPATIENT)
Dept: PRIMARY CARE CLINIC | Facility: CLINIC | Age: 55
End: 2025-04-25

## 2025-04-25 VITALS
HEART RATE: 67 BPM | OXYGEN SATURATION: 98 % | BODY MASS INDEX: 34.64 KG/M2 | TEMPERATURE: 98.3 F | SYSTOLIC BLOOD PRESSURE: 132 MMHG | DIASTOLIC BLOOD PRESSURE: 74 MMHG | WEIGHT: 183.5 LBS | HEIGHT: 61 IN

## 2025-04-25 DIAGNOSIS — Z83.42 FAMILY HISTORY OF HIGH CHOLESTEROL: ICD-10-CM

## 2025-04-25 DIAGNOSIS — Z83.3 FAMILY HISTORY OF DIABETES MELLITUS IN SISTER: ICD-10-CM

## 2025-04-25 DIAGNOSIS — E55.9 VITAMIN D DEFICIENCY: ICD-10-CM

## 2025-04-25 DIAGNOSIS — Z79.899 ENCOUNTER FOR LONG-TERM (CURRENT) USE OF MEDICATIONS: ICD-10-CM

## 2025-04-25 DIAGNOSIS — I10 ESSENTIAL HYPERTENSION: Primary | ICD-10-CM

## 2025-04-25 DIAGNOSIS — R53.82 CHRONIC FATIGUE: ICD-10-CM

## 2025-04-25 LAB
25(OH)D3 SERPL-MCNC: 25.7 NG/ML (ref 30–100)
ALBUMIN SERPL-MCNC: 3.6 G/DL (ref 3.5–5)
ALBUMIN/GLOB SERPL: 1 (ref 1–1.9)
ALP SERPL-CCNC: 74 U/L (ref 35–104)
ALT SERPL-CCNC: 26 U/L (ref 8–45)
ANION GAP SERPL CALC-SCNC: 12 MMOL/L (ref 7–16)
AST SERPL-CCNC: 27 U/L (ref 15–37)
BASOPHILS # BLD: 0.02 K/UL (ref 0–0.2)
BASOPHILS NFR BLD: 0.4 % (ref 0–2)
BILIRUB SERPL-MCNC: 0.3 MG/DL (ref 0–1.2)
BUN SERPL-MCNC: 12 MG/DL (ref 6–23)
CALCIUM SERPL-MCNC: 9.5 MG/DL (ref 8.8–10.2)
CHLORIDE SERPL-SCNC: 107 MMOL/L (ref 98–107)
CHOLEST SERPL-MCNC: 137 MG/DL (ref 0–200)
CO2 SERPL-SCNC: 24 MMOL/L (ref 20–29)
CREAT SERPL-MCNC: 0.9 MG/DL (ref 0.6–1.1)
DIFFERENTIAL METHOD BLD: NORMAL
EOSINOPHIL # BLD: 0.05 K/UL (ref 0–0.8)
EOSINOPHIL NFR BLD: 1.1 % (ref 0.5–7.8)
ERYTHROCYTE [DISTWIDTH] IN BLOOD BY AUTOMATED COUNT: 13.2 % (ref 11.9–14.6)
EST. AVERAGE GLUCOSE BLD GHB EST-MCNC: 125 MG/DL
GLOBULIN SER CALC-MCNC: 3.7 G/DL (ref 2.3–3.5)
GLUCOSE SERPL-MCNC: 88 MG/DL (ref 70–99)
HBA1C MFR BLD: 6 % (ref 0–5.6)
HCT VFR BLD AUTO: 38.4 % (ref 35.8–46.3)
HDLC SERPL-MCNC: 55 MG/DL (ref 40–60)
HDLC SERPL: 2.5 (ref 0–5)
HGB BLD-MCNC: 12.6 G/DL (ref 11.7–15.4)
IMM GRANULOCYTES # BLD AUTO: 0.01 K/UL (ref 0–0.5)
IMM GRANULOCYTES NFR BLD AUTO: 0.2 % (ref 0–5)
LDLC SERPL CALC-MCNC: 70 MG/DL (ref 0–100)
LYMPHOCYTES # BLD: 1.51 K/UL (ref 0.5–4.6)
LYMPHOCYTES NFR BLD: 32.8 % (ref 13–44)
MCH RBC QN AUTO: 29 PG (ref 26.1–32.9)
MCHC RBC AUTO-ENTMCNC: 32.8 G/DL (ref 31.4–35)
MCV RBC AUTO: 88.3 FL (ref 82–102)
MONOCYTES # BLD: 0.51 K/UL (ref 0.1–1.3)
MONOCYTES NFR BLD: 11.1 % (ref 4–12)
NEUTS SEG # BLD: 2.51 K/UL (ref 1.7–8.2)
NEUTS SEG NFR BLD: 54.4 % (ref 43–78)
NRBC # BLD: 0 K/UL (ref 0–0.2)
PLATELET # BLD AUTO: 275 K/UL (ref 150–450)
PMV BLD AUTO: 11 FL (ref 9.4–12.3)
POTASSIUM SERPL-SCNC: 4.3 MMOL/L (ref 3.5–5.1)
PROT SERPL-MCNC: 7.3 G/DL (ref 6.3–8.2)
RBC # BLD AUTO: 4.35 M/UL (ref 4.05–5.2)
SODIUM SERPL-SCNC: 142 MMOL/L (ref 136–145)
T4 FREE SERPL-MCNC: 1 NG/DL (ref 0.9–1.7)
TRIGL SERPL-MCNC: 58 MG/DL (ref 0–150)
TSH, 3RD GENERATION: 0.88 UIU/ML (ref 0.27–4.2)
VLDLC SERPL CALC-MCNC: 12 MG/DL (ref 6–23)
WBC # BLD AUTO: 4.6 K/UL (ref 4.3–11.1)

## 2025-04-25 PROCEDURE — 99203 OFFICE O/P NEW LOW 30 MIN: CPT | Performed by: FAMILY MEDICINE

## 2025-04-25 PROCEDURE — 3078F DIAST BP <80 MM HG: CPT | Performed by: FAMILY MEDICINE

## 2025-04-25 PROCEDURE — 3075F SYST BP GE 130 - 139MM HG: CPT | Performed by: FAMILY MEDICINE

## 2025-04-25 SDOH — ECONOMIC STABILITY: FOOD INSECURITY: WITHIN THE PAST 12 MONTHS, YOU WORRIED THAT YOUR FOOD WOULD RUN OUT BEFORE YOU GOT MONEY TO BUY MORE.: NEVER TRUE

## 2025-04-25 SDOH — ECONOMIC STABILITY: FOOD INSECURITY: WITHIN THE PAST 12 MONTHS, THE FOOD YOU BOUGHT JUST DIDN'T LAST AND YOU DIDN'T HAVE MONEY TO GET MORE.: NEVER TRUE

## 2025-04-25 ASSESSMENT — PATIENT HEALTH QUESTIONNAIRE - PHQ9
SUM OF ALL RESPONSES TO PHQ QUESTIONS 1-9: 0
SUM OF ALL RESPONSES TO PHQ QUESTIONS 1-9: 0
2. FEELING DOWN, DEPRESSED OR HOPELESS: NOT AT ALL
1. LITTLE INTEREST OR PLEASURE IN DOING THINGS: NOT AT ALL
SUM OF ALL RESPONSES TO PHQ QUESTIONS 1-9: 0
SUM OF ALL RESPONSES TO PHQ QUESTIONS 1-9: 0

## 2025-04-25 NOTE — PROGRESS NOTES
[facility] PRIMARY CARE  Josiane Richardson M.D.  [address] [place] [address]  Phone:  [phone]  Fax:  [phone]    CHIEF COMPLAINT:  Chief Complaint   Patient presents with    New Patient     Pt presents today to reestablish care. Pt is fasting .         HISTORY OF PRESENT ILLNESS:  Ms. Hardy is a 55 y.o. female  who presents to re-establish care. Maru Hardy, a 55-year-old female with a family history of diabetes and hypertension, presents today feeling unwell. She reports feeling \"not too good\" and describes her symptoms as feeling light-headed and \"nasty.\" She denies experiencing chest pain, shortness of breath, headaches, blurred vision or joint pain.     Her blood pressure is good at 132/74. Denies chest pain, shortness of breath, headaches or blurred vision.    Ms. Hardy also mentions some gastrointestinal discomfort, stating \"My stomach hurts and I'm tired.\" She denies any blood or black stools.     She is not taking any medications.    HISTORY:  Allergies   Allergen Reactions    Olmesartan Itching, Rash and Swelling    Amlodipine Itching    Lisinopril Angioedema     Tongue swells    Sulfa Antibiotics Other (See Comments)     Pt can't remember but was hospitalized    Sulfur Nausea And Vomiting       REVIEW OF SYSTEMS:  Review of systems is as indicated in HPI otherwise negative.    PHYSICAL EXAM:  Visit Vitals  /74 (BP Site: Left Upper Arm, Patient Position: Sitting)   Pulse 67   Temp 98.3 °F (36.8 °C) (Temporal)   Ht 1.549 m (5' 1\")   Wt 83.2 kg (183 lb 8 oz)   SpO2 98%   BMI 34.67 kg/m²        Physical Exam  Vitals and nursing note reviewed.   Constitutional:       Appearance: Normal appearance.   HENT:      Head: Normocephalic and atraumatic.      Nose: Nose normal.      Mouth/Throat:      Mouth: Mucous membranes are moist.   Eyes:      Extraocular Movements: Extraocular movements intact.      Pupils: Pupils are equal, round, and reactive to light.

## 2025-05-12 ENCOUNTER — OFFICE VISIT (OUTPATIENT)
Dept: UROLOGY | Age: 55
End: 2025-05-12
Payer: COMMERCIAL

## 2025-05-12 DIAGNOSIS — N20.0 KIDNEY STONE: Primary | ICD-10-CM

## 2025-05-12 LAB
BILIRUBIN, URINE, POC: NEGATIVE
BLOOD URINE, POC: NORMAL
GLUCOSE URINE, POC: NEGATIVE MG/DL
KETONES, URINE, POC: NEGATIVE MG/DL
LEUKOCYTE ESTERASE, URINE, POC: NORMAL
NITRITE, URINE, POC: NEGATIVE
PH, URINE, POC: 6.5 (ref 4.6–8)
PROTEIN,URINE, POC: NEGATIVE MG/DL
SPECIFIC GRAVITY, URINE, POC: 1.02 (ref 1–1.03)
URINALYSIS CLARITY, POC: NORMAL
URINALYSIS COLOR, POC: NORMAL
UROBILINOGEN, POC: NORMAL MG/DL

## 2025-05-12 PROCEDURE — 81003 URINALYSIS AUTO W/O SCOPE: CPT | Performed by: NURSE PRACTITIONER

## 2025-05-12 PROCEDURE — 99214 OFFICE O/P EST MOD 30 MIN: CPT | Performed by: NURSE PRACTITIONER

## 2025-05-12 PROCEDURE — 74018 RADEX ABDOMEN 1 VIEW: CPT | Performed by: NURSE PRACTITIONER

## 2025-05-12 ASSESSMENT — ENCOUNTER SYMPTOMS
BACK PAIN: 0
NAUSEA: 0

## 2025-05-12 NOTE — PROGRESS NOTES
[facility] Urology  [address] [place] [address]  [phone]          Maru Hardy  : 1970    Chief Complaint   Patient presents with    Follow-up          HPI     Maru Hardy is a 55 y.o. female who is followed for kidney stones. S/P L ESWL on 24. S/P L URS, laser, stent on 25 for a persistent L renal stone. Back for follow up. No urological concerns.    Past Medical History:   Diagnosis Date    Abnormal Papanicolaou smear of cervix     Anemia     Dyspepsia 2014    Encounter for long-term (current) use of other medications 2015    Fibroid, uterine     GERD (gastroesophageal reflux disease)     no meds    Heavy menstrual bleeding 2015    History of kidney stones 2018    Hypertension     no meds    Kidney stone     Ovarian cyst     with pregnancy    Type II or unspecified type diabetes mellitus without mention of complication, not stated as uncontrolled 2015    borderline- no meds    Unspecified vitamin D deficiency 2014    Uterine fibroid 2015    UTI (urinary tract infection)     Vaginitis, atrophic      Past Surgical History:   Procedure Laterality Date    CYSTOSCOPY Left 2025    CYSTOSCOPY LEFT RETROGRADE, URETEROSCOPY, LASER LITHOTRIPSY, LEFT URETERAL STENT performed by Sohail Plascencia DO at [facility] MAIN OR    FRAGMENT KIDNEY STONE/ ESWL      x 2    LAP,TUBAL CAUTERY      LITHOTRIPSY Left 2024    EXTRACORPOREAL SHOCK WAVE LITHOTRIPSY performed by Sohail Plascencia DO at [facility] MAIN OR    TUBAL LIGATION      tubal     No current outpatient medications on file.     No current facility-administered medications for this visit.     Allergies   Allergen Reactions    Olmesartan Itching, Rash and Swelling    Amlodipine Itching    Lisinopril Angioedema     Tongue swells    Sulfa Antibiotics Other (See Comments)     Pt can't remember but was hospitalized    Sulfur Nausea And Vomiting     Social History

## (undated) DEVICE — FLEXIVA PULSE AND FLEXIVA PULSE TRACTIP LASER FIBERS ARE HIGH POWER SINGLE-USE: Brand: FLEXIVA PULSE

## (undated) DEVICE — GUIDEWIRE UROLOGICAL STR 3 CM 0.038 INX150 CM DUAL-FLEX

## (undated) DEVICE — DEVICE IRRIG TBNG L10IN 30ML POLYVI BLB LUERLOCK FLO CTRL

## (undated) DEVICE — Device

## (undated) DEVICE — SOLUTION IRRIG 3000ML H2O STRL BAG

## (undated) DEVICE — GARMENT,MEDLINE,DVT,INT,CALF,LG, GEN2: Brand: MEDLINE

## (undated) DEVICE — SYRINGE MED 10ML LUERLOCK TIP W/O SFTY DISP

## (undated) DEVICE — SINGLE-USE DIGITAL FLEXIBLE URETEROSCOPE: Brand: LITHOVUE

## (undated) DEVICE — NITINOL STONE RETRIEVAL BASKET: Brand: ZERO TIP

## (undated) DEVICE — KENDALL SCD EXPRESS SLEEVES, KNEE LENGTH, MEDIUM: Brand: KENDALL SCD

## (undated) DEVICE — BAG DRAIN UROLOGY GENESIS NS